# Patient Record
Sex: MALE | Race: WHITE | NOT HISPANIC OR LATINO | Employment: OTHER | URBAN - METROPOLITAN AREA
[De-identification: names, ages, dates, MRNs, and addresses within clinical notes are randomized per-mention and may not be internally consistent; named-entity substitution may affect disease eponyms.]

---

## 2022-10-07 ENCOUNTER — MEDICAL CORRESPONDENCE (OUTPATIENT)
Dept: HEALTH INFORMATION MANAGEMENT | Facility: CLINIC | Age: 82
End: 2022-10-07

## 2022-10-19 RX ORDER — FINASTERIDE 5 MG/1
5 TABLET, FILM COATED ORAL AT BEDTIME
COMMUNITY

## 2022-10-19 RX ORDER — TAMSULOSIN HYDROCHLORIDE 0.4 MG/1
0.4 CAPSULE ORAL EVERY EVENING
COMMUNITY

## 2022-10-19 RX ORDER — CEPHALEXIN 250 MG/1
250 TABLET ORAL DAILY
COMMUNITY

## 2022-10-19 RX ORDER — AMLODIPINE BESYLATE 5 MG/1
5 TABLET ORAL EVERY EVENING
COMMUNITY

## 2022-10-19 RX ORDER — ASPIRIN 81 MG/1
81 TABLET ORAL DAILY
Status: ON HOLD | COMMUNITY
End: 2022-11-03

## 2022-10-24 ENCOUNTER — TRANSFERRED RECORDS (OUTPATIENT)
Dept: HEALTH INFORMATION MANAGEMENT | Facility: CLINIC | Age: 82
End: 2022-10-24

## 2022-10-24 LAB
ALT SERPL-CCNC: 20 IU-L (ref 4–49)
AST SERPL-CCNC: 28 IU-L (ref 17–59)
CHOLESTEROL (EXTERNAL): 150 MG/DL (ref 0–200)
CREATININE (EXTERNAL): 0.9 MG/DL (ref 0.66–1.25)
GLUCOSE (EXTERNAL): 146 MG/DL (ref 74–99)
HBA1C MFR BLD: 6.2 % (ref 4.6–6.2)
HDLC SERPL-MCNC: 52 MG/DL (ref 40–60)
INR (EXTERNAL): 1 (ref 0.9–3.9)
LDL CHOLESTEROL CALCULATED (EXTERNAL): 73 MG/DL (ref 60–160)
POTASSIUM (EXTERNAL): 4.4 MMOL/L (ref 3.5–5.1)
TRIGLYCERIDES (EXTERNAL): 126 MG/DL (ref 0–150)

## 2022-11-02 ENCOUNTER — APPOINTMENT (OUTPATIENT)
Dept: GENERAL RADIOLOGY | Facility: CLINIC | Age: 82
DRG: 455 | End: 2022-11-02
Attending: ORTHOPAEDIC SURGERY
Payer: MEDICARE

## 2022-11-02 ENCOUNTER — HOSPITAL ENCOUNTER (INPATIENT)
Facility: CLINIC | Age: 82
LOS: 2 days | Discharge: HOME | DRG: 455 | End: 2022-11-04
Attending: ORTHOPAEDIC SURGERY | Admitting: ORTHOPAEDIC SURGERY
Payer: MEDICARE

## 2022-11-02 ENCOUNTER — ANESTHESIA EVENT (OUTPATIENT)
Dept: SURGERY | Facility: CLINIC | Age: 82
DRG: 455 | End: 2022-11-02
Payer: MEDICARE

## 2022-11-02 ENCOUNTER — ANESTHESIA (OUTPATIENT)
Dept: SURGERY | Facility: CLINIC | Age: 82
DRG: 455 | End: 2022-11-02
Payer: MEDICARE

## 2022-11-02 DIAGNOSIS — Z98.1 S/P LUMBAR FUSION: Primary | ICD-10-CM

## 2022-11-02 LAB
GLUCOSE BLDC GLUCOMTR-MCNC: 116 MG/DL (ref 70–99)
GLUCOSE BLDC GLUCOMTR-MCNC: 120 MG/DL (ref 70–99)
GLUCOSE BLDC GLUCOMTR-MCNC: 142 MG/DL (ref 70–99)
GLUCOSE BLDC GLUCOMTR-MCNC: 170 MG/DL (ref 70–99)
SARS-COV-2 RNA RESP QL NAA+PROBE: NEGATIVE

## 2022-11-02 PROCEDURE — 0SG00AJ FUSION OF LUMBAR VERTEBRAL JOINT WITH INTERBODY FUSION DEVICE, POSTERIOR APPROACH, ANTERIOR COLUMN, OPEN APPROACH: ICD-10-PCS | Performed by: ORTHOPAEDIC SURGERY

## 2022-11-02 PROCEDURE — 258N000003 HC RX IP 258 OP 636: Performed by: PHYSICIAN ASSISTANT

## 2022-11-02 PROCEDURE — 250N000013 HC RX MED GY IP 250 OP 250 PS 637: Performed by: PHYSICIAN ASSISTANT

## 2022-11-02 PROCEDURE — 250N000011 HC RX IP 250 OP 636: Performed by: NURSE ANESTHETIST, CERTIFIED REGISTERED

## 2022-11-02 PROCEDURE — 250N000013 HC RX MED GY IP 250 OP 250 PS 637: Performed by: ANESTHESIOLOGY

## 2022-11-02 PROCEDURE — 360N000085 HC SURGERY LEVEL 5 W/ FLUORO, PER MIN: Performed by: ORTHOPAEDIC SURGERY

## 2022-11-02 PROCEDURE — 278N000051 HC OR IMPLANT GENERAL: Performed by: ORTHOPAEDIC SURGERY

## 2022-11-02 PROCEDURE — 370N000017 HC ANESTHESIA TECHNICAL FEE, PER MIN: Performed by: ORTHOPAEDIC SURGERY

## 2022-11-02 PROCEDURE — 999N000179 XR SURGERY CARM FLUORO LESS THAN 5 MIN W STILLS: Mod: TC

## 2022-11-02 PROCEDURE — 250N000011 HC RX IP 250 OP 636: Performed by: PHYSICIAN ASSISTANT

## 2022-11-02 PROCEDURE — 258N000003 HC RX IP 258 OP 636: Performed by: ANESTHESIOLOGY

## 2022-11-02 PROCEDURE — 120N000001 HC R&B MED SURG/OB

## 2022-11-02 PROCEDURE — U0003 INFECTIOUS AGENT DETECTION BY NUCLEIC ACID (DNA OR RNA); SEVERE ACUTE RESPIRATORY SYNDROME CORONAVIRUS 2 (SARS-COV-2) (CORONAVIRUS DISEASE [COVID-19]), AMPLIFIED PROBE TECHNIQUE, MAKING USE OF HIGH THROUGHPUT TECHNOLOGIES AS DESCRIBED BY CMS-2020-01-R: HCPCS | Performed by: ORTHOPAEDIC SURGERY

## 2022-11-02 PROCEDURE — 710N000009 HC RECOVERY PHASE 1, LEVEL 1, PER MIN: Performed by: ORTHOPAEDIC SURGERY

## 2022-11-02 PROCEDURE — 8E0WXBF COMPUTER ASSISTED PROCEDURE OF TRUNK REGION, WITH FLUOROSCOPY: ICD-10-PCS | Performed by: ORTHOPAEDIC SURGERY

## 2022-11-02 PROCEDURE — C1713 ANCHOR/SCREW BN/BN,TIS/BN: HCPCS | Performed by: ORTHOPAEDIC SURGERY

## 2022-11-02 PROCEDURE — 250N000009 HC RX 250: Performed by: NURSE ANESTHETIST, CERTIFIED REGISTERED

## 2022-11-02 PROCEDURE — 99222 1ST HOSP IP/OBS MODERATE 55: CPT | Performed by: PHYSICIAN ASSISTANT

## 2022-11-02 PROCEDURE — 999N000141 HC STATISTIC PRE-PROCEDURE NURSING ASSESSMENT: Performed by: ORTHOPAEDIC SURGERY

## 2022-11-02 PROCEDURE — C1762 CONN TISS, HUMAN(INC FASCIA): HCPCS | Performed by: ORTHOPAEDIC SURGERY

## 2022-11-02 PROCEDURE — 0ST20ZZ RESECTION OF LUMBAR VERTEBRAL DISC, OPEN APPROACH: ICD-10-PCS | Performed by: ORTHOPAEDIC SURGERY

## 2022-11-02 PROCEDURE — 272N000001 HC OR GENERAL SUPPLY STERILE: Performed by: ORTHOPAEDIC SURGERY

## 2022-11-02 PROCEDURE — 0SG0071 FUSION OF LUMBAR VERTEBRAL JOINT WITH AUTOLOGOUS TISSUE SUBSTITUTE, POSTERIOR APPROACH, POSTERIOR COLUMN, OPEN APPROACH: ICD-10-PCS | Performed by: ORTHOPAEDIC SURGERY

## 2022-11-02 PROCEDURE — 250N000009 HC RX 250: Performed by: ORTHOPAEDIC SURGERY

## 2022-11-02 PROCEDURE — 250N000009 HC RX 250: Performed by: ANESTHESIOLOGY

## 2022-11-02 DEVICE — IMP SPI INTERBODY MEDT CATALYFT PL SHORT 7MM 6068073: Type: IMPLANTABLE DEVICE | Site: SPINE LUMBAR | Status: FUNCTIONAL

## 2022-11-02 DEVICE — GRAFT BONE FIBERS DBF 6ML INJ T50306 PRELOADED: Type: IMPLANTABLE DEVICE | Site: SPINE LUMBAR | Status: FUNCTIONAL

## 2022-11-02 DEVICE — IMP SCR MEDT 5.5/6.0MM SOLERA 7.5X35MM MA 55840007535: Type: IMPLANTABLE DEVICE | Site: SPINE LUMBAR | Status: FUNCTIONAL

## 2022-11-02 DEVICE — IMP SCR MEDT 5.5/6.0MM SOLERA 6.5X45MM MA 55840006545: Type: IMPLANTABLE DEVICE | Site: SPINE LUMBAR | Status: FUNCTIONAL

## 2022-11-02 DEVICE — IMP ROD MEDT SOLERA CVD 5.5X35MM TI 1553201035: Type: IMPLANTABLE DEVICE | Site: SPINE LUMBAR | Status: FUNCTIONAL

## 2022-11-02 DEVICE — IMP SCR SET MEDT SOLERA BREAK OFF 5.5MM TI 5540030: Type: IMPLANTABLE DEVICE | Site: SPINE LUMBAR | Status: FUNCTIONAL

## 2022-11-02 DEVICE — GRAFT BONE MAGNIFUSE 1CMX5CM 7509215: Type: IMPLANTABLE DEVICE | Site: SPINE LUMBAR | Status: FUNCTIONAL

## 2022-11-02 RX ORDER — LIDOCAINE 40 MG/G
CREAM TOPICAL
Status: DISCONTINUED | OUTPATIENT
Start: 2022-11-02 | End: 2022-11-02 | Stop reason: HOSPADM

## 2022-11-02 RX ORDER — ACETAMINOPHEN 325 MG/1
650 TABLET ORAL EVERY 4 HOURS PRN
Status: DISCONTINUED | OUTPATIENT
Start: 2022-11-05 | End: 2022-11-04 | Stop reason: HOSPADM

## 2022-11-02 RX ORDER — NEOSTIGMINE METHYLSULFATE 1 MG/ML
VIAL (ML) INJECTION PRN
Status: DISCONTINUED | OUTPATIENT
Start: 2022-11-02 | End: 2022-11-02

## 2022-11-02 RX ORDER — PROPOFOL 10 MG/ML
INJECTION, EMULSION INTRAVENOUS PRN
Status: DISCONTINUED | OUTPATIENT
Start: 2022-11-02 | End: 2022-11-02

## 2022-11-02 RX ORDER — METOPROLOL SUCCINATE 50 MG/1
50 TABLET, EXTENDED RELEASE ORAL DAILY
Status: DISCONTINUED | OUTPATIENT
Start: 2022-11-03 | End: 2022-11-04 | Stop reason: HOSPADM

## 2022-11-02 RX ORDER — NALOXONE HYDROCHLORIDE 0.4 MG/ML
0.2 INJECTION, SOLUTION INTRAMUSCULAR; INTRAVENOUS; SUBCUTANEOUS
Status: DISCONTINUED | OUTPATIENT
Start: 2022-11-02 | End: 2022-11-04 | Stop reason: HOSPADM

## 2022-11-02 RX ORDER — ONDANSETRON 4 MG/1
4 TABLET, ORALLY DISINTEGRATING ORAL EVERY 6 HOURS PRN
Status: DISCONTINUED | OUTPATIENT
Start: 2022-11-02 | End: 2022-11-04 | Stop reason: HOSPADM

## 2022-11-02 RX ORDER — GABAPENTIN 100 MG/1
100 CAPSULE ORAL
Status: COMPLETED | OUTPATIENT
Start: 2022-11-02 | End: 2022-11-02

## 2022-11-02 RX ORDER — BUPIVACAINE HYDROCHLORIDE AND EPINEPHRINE 2.5; 5 MG/ML; UG/ML
INJECTION, SOLUTION EPIDURAL; INFILTRATION; INTRACAUDAL; PERINEURAL PRN
Status: DISCONTINUED | OUTPATIENT
Start: 2022-11-02 | End: 2022-11-02 | Stop reason: HOSPADM

## 2022-11-02 RX ORDER — HYDROXYZINE HYDROCHLORIDE 10 MG/1
10 TABLET, FILM COATED ORAL EVERY 6 HOURS PRN
Status: DISCONTINUED | OUTPATIENT
Start: 2022-11-02 | End: 2022-11-04 | Stop reason: HOSPADM

## 2022-11-02 RX ORDER — FINASTERIDE 5 MG/1
5 TABLET, FILM COATED ORAL AT BEDTIME
Status: DISCONTINUED | OUTPATIENT
Start: 2022-11-02 | End: 2022-11-04 | Stop reason: HOSPADM

## 2022-11-02 RX ORDER — VITAMIN B COMPLEX
25 TABLET ORAL 2 TIMES DAILY
Status: DISCONTINUED | OUTPATIENT
Start: 2022-11-02 | End: 2022-11-04 | Stop reason: HOSPADM

## 2022-11-02 RX ORDER — LIDOCAINE 40 MG/G
CREAM TOPICAL
Status: DISCONTINUED | OUTPATIENT
Start: 2022-11-02 | End: 2022-11-04 | Stop reason: HOSPADM

## 2022-11-02 RX ORDER — ONDANSETRON 2 MG/ML
INJECTION INTRAMUSCULAR; INTRAVENOUS PRN
Status: DISCONTINUED | OUTPATIENT
Start: 2022-11-02 | End: 2022-11-02

## 2022-11-02 RX ORDER — SODIUM CHLORIDE, SODIUM LACTATE, POTASSIUM CHLORIDE, CALCIUM CHLORIDE 600; 310; 30; 20 MG/100ML; MG/100ML; MG/100ML; MG/100ML
INJECTION, SOLUTION INTRAVENOUS CONTINUOUS
Status: DISCONTINUED | OUTPATIENT
Start: 2022-11-02 | End: 2022-11-02 | Stop reason: HOSPADM

## 2022-11-02 RX ORDER — DEXTROSE MONOHYDRATE 25 G/50ML
25-50 INJECTION, SOLUTION INTRAVENOUS
Status: DISCONTINUED | OUTPATIENT
Start: 2022-11-02 | End: 2022-11-04 | Stop reason: HOSPADM

## 2022-11-02 RX ORDER — CEFAZOLIN SODIUM 1 G/3ML
1 INJECTION, POWDER, FOR SOLUTION INTRAMUSCULAR; INTRAVENOUS EVERY 8 HOURS
Status: COMPLETED | OUTPATIENT
Start: 2022-11-02 | End: 2022-11-03

## 2022-11-02 RX ORDER — ACETAMINOPHEN 325 MG/1
975 TABLET ORAL EVERY 8 HOURS
Status: DISCONTINUED | OUTPATIENT
Start: 2022-11-02 | End: 2022-11-04 | Stop reason: HOSPADM

## 2022-11-02 RX ORDER — GLYCOPYRROLATE 0.2 MG/ML
INJECTION, SOLUTION INTRAMUSCULAR; INTRAVENOUS PRN
Status: DISCONTINUED | OUTPATIENT
Start: 2022-11-02 | End: 2022-11-02

## 2022-11-02 RX ORDER — HYDROMORPHONE HCL IN WATER/PF 6 MG/30 ML
0.2 PATIENT CONTROLLED ANALGESIA SYRINGE INTRAVENOUS EVERY 5 MIN PRN
Status: DISCONTINUED | OUTPATIENT
Start: 2022-11-02 | End: 2022-11-02 | Stop reason: HOSPADM

## 2022-11-02 RX ORDER — CEPHALEXIN 250 MG/1
250 TABLET ORAL DAILY
Status: DISCONTINUED | OUTPATIENT
Start: 2022-11-03 | End: 2022-11-03 | Stop reason: CLARIF

## 2022-11-02 RX ORDER — METOPROLOL SUCCINATE 50 MG/1
50 TABLET, EXTENDED RELEASE ORAL DAILY
COMMUNITY

## 2022-11-02 RX ORDER — CEFAZOLIN SODIUM/WATER 2 G/20 ML
2 SYRINGE (ML) INTRAVENOUS
Status: COMPLETED | OUTPATIENT
Start: 2022-11-02 | End: 2022-11-02

## 2022-11-02 RX ORDER — HYDROMORPHONE HYDROCHLORIDE 2 MG/1
4 TABLET ORAL EVERY 4 HOURS PRN
Status: DISCONTINUED | OUTPATIENT
Start: 2022-11-02 | End: 2022-11-04 | Stop reason: HOSPADM

## 2022-11-02 RX ORDER — MAGNESIUM HYDROXIDE 1200 MG/15ML
LIQUID ORAL PRN
Status: DISCONTINUED | OUTPATIENT
Start: 2022-11-02 | End: 2022-11-02 | Stop reason: HOSPADM

## 2022-11-02 RX ORDER — FENTANYL CITRATE 50 UG/ML
25 INJECTION, SOLUTION INTRAMUSCULAR; INTRAVENOUS EVERY 5 MIN PRN
Status: DISCONTINUED | OUTPATIENT
Start: 2022-11-02 | End: 2022-11-02 | Stop reason: HOSPADM

## 2022-11-02 RX ORDER — SODIUM CHLORIDE 9 MG/ML
INJECTION, SOLUTION INTRAVENOUS CONTINUOUS
Status: DISCONTINUED | OUTPATIENT
Start: 2022-11-02 | End: 2022-11-04 | Stop reason: HOSPADM

## 2022-11-02 RX ORDER — HYDROMORPHONE HCL IN WATER/PF 6 MG/30 ML
0.4 PATIENT CONTROLLED ANALGESIA SYRINGE INTRAVENOUS
Status: DISCONTINUED | OUTPATIENT
Start: 2022-11-02 | End: 2022-11-04 | Stop reason: HOSPADM

## 2022-11-02 RX ORDER — HYDROMORPHONE HYDROCHLORIDE 2 MG/1
2 TABLET ORAL EVERY 4 HOURS PRN
Status: DISCONTINUED | OUTPATIENT
Start: 2022-11-02 | End: 2022-11-04 | Stop reason: HOSPADM

## 2022-11-02 RX ORDER — CEFAZOLIN SODIUM/WATER 2 G/20 ML
2 SYRINGE (ML) INTRAVENOUS SEE ADMIN INSTRUCTIONS
Status: DISCONTINUED | OUTPATIENT
Start: 2022-11-02 | End: 2022-11-02 | Stop reason: HOSPADM

## 2022-11-02 RX ORDER — METHOCARBAMOL 500 MG/1
500 TABLET, FILM COATED ORAL EVERY 6 HOURS PRN
Status: DISCONTINUED | OUTPATIENT
Start: 2022-11-02 | End: 2022-11-04 | Stop reason: HOSPADM

## 2022-11-02 RX ORDER — VASOPRESSIN 20 U/ML
INJECTION PARENTERAL PRN
Status: DISCONTINUED | OUTPATIENT
Start: 2022-11-02 | End: 2022-11-02

## 2022-11-02 RX ORDER — PANTOPRAZOLE SODIUM 40 MG/1
40 TABLET, DELAYED RELEASE ORAL DAILY
Status: DISCONTINUED | OUTPATIENT
Start: 2022-11-02 | End: 2022-11-04 | Stop reason: HOSPADM

## 2022-11-02 RX ORDER — OXYCODONE HYDROCHLORIDE 5 MG/1
5 TABLET ORAL EVERY 4 HOURS PRN
Status: DISCONTINUED | OUTPATIENT
Start: 2022-11-02 | End: 2022-11-02 | Stop reason: HOSPADM

## 2022-11-02 RX ORDER — POLYETHYLENE GLYCOL 3350 17 G/17G
17 POWDER, FOR SOLUTION ORAL DAILY
Status: DISCONTINUED | OUTPATIENT
Start: 2022-11-03 | End: 2022-11-04 | Stop reason: HOSPADM

## 2022-11-02 RX ORDER — NALOXONE HYDROCHLORIDE 0.4 MG/ML
0.4 INJECTION, SOLUTION INTRAMUSCULAR; INTRAVENOUS; SUBCUTANEOUS
Status: DISCONTINUED | OUTPATIENT
Start: 2022-11-02 | End: 2022-11-04 | Stop reason: HOSPADM

## 2022-11-02 RX ORDER — AMOXICILLIN 250 MG
1 CAPSULE ORAL 2 TIMES DAILY
Status: DISCONTINUED | OUTPATIENT
Start: 2022-11-02 | End: 2022-11-04 | Stop reason: HOSPADM

## 2022-11-02 RX ORDER — ONDANSETRON 2 MG/ML
4 INJECTION INTRAMUSCULAR; INTRAVENOUS EVERY 6 HOURS PRN
Status: DISCONTINUED | OUTPATIENT
Start: 2022-11-02 | End: 2022-11-04 | Stop reason: HOSPADM

## 2022-11-02 RX ORDER — ONDANSETRON 2 MG/ML
4 INJECTION INTRAMUSCULAR; INTRAVENOUS EVERY 30 MIN PRN
Status: DISCONTINUED | OUTPATIENT
Start: 2022-11-02 | End: 2022-11-02 | Stop reason: HOSPADM

## 2022-11-02 RX ORDER — TAMSULOSIN HYDROCHLORIDE 0.4 MG/1
0.4 CAPSULE ORAL EVERY EVENING
Status: DISCONTINUED | OUTPATIENT
Start: 2022-11-02 | End: 2022-11-04 | Stop reason: HOSPADM

## 2022-11-02 RX ORDER — NICOTINE POLACRILEX 4 MG
15-30 LOZENGE BUCCAL
Status: DISCONTINUED | OUTPATIENT
Start: 2022-11-02 | End: 2022-11-04 | Stop reason: HOSPADM

## 2022-11-02 RX ORDER — METFORMIN HCL 500 MG
500 TABLET, EXTENDED RELEASE 24 HR ORAL
COMMUNITY

## 2022-11-02 RX ORDER — ONDANSETRON 4 MG/1
4 TABLET, ORALLY DISINTEGRATING ORAL EVERY 30 MIN PRN
Status: DISCONTINUED | OUTPATIENT
Start: 2022-11-02 | End: 2022-11-02 | Stop reason: HOSPADM

## 2022-11-02 RX ORDER — METOPROLOL TARTRATE 1 MG/ML
1-2 INJECTION, SOLUTION INTRAVENOUS EVERY 5 MIN PRN
Status: DISCONTINUED | OUTPATIENT
Start: 2022-11-02 | End: 2022-11-02 | Stop reason: HOSPADM

## 2022-11-02 RX ORDER — BISACODYL 10 MG
10 SUPPOSITORY, RECTAL RECTAL DAILY PRN
Status: DISCONTINUED | OUTPATIENT
Start: 2022-11-02 | End: 2022-11-04 | Stop reason: HOSPADM

## 2022-11-02 RX ORDER — FENTANYL CITRATE 50 UG/ML
INJECTION, SOLUTION INTRAMUSCULAR; INTRAVENOUS PRN
Status: DISCONTINUED | OUTPATIENT
Start: 2022-11-02 | End: 2022-11-02

## 2022-11-02 RX ORDER — HYDROMORPHONE HCL IN WATER/PF 6 MG/30 ML
0.2 PATIENT CONTROLLED ANALGESIA SYRINGE INTRAVENOUS
Status: DISCONTINUED | OUTPATIENT
Start: 2022-11-02 | End: 2022-11-04 | Stop reason: HOSPADM

## 2022-11-02 RX ORDER — PROCHLORPERAZINE MALEATE 5 MG
5 TABLET ORAL EVERY 6 HOURS PRN
Status: DISCONTINUED | OUTPATIENT
Start: 2022-11-02 | End: 2022-11-04 | Stop reason: HOSPADM

## 2022-11-02 RX ADMIN — ROCURONIUM BROMIDE 40 MG: 50 INJECTION, SOLUTION INTRAVENOUS at 08:22

## 2022-11-02 RX ADMIN — SODIUM CHLORIDE, POTASSIUM CHLORIDE, SODIUM LACTATE AND CALCIUM CHLORIDE: 600; 310; 30; 20 INJECTION, SOLUTION INTRAVENOUS at 09:22

## 2022-11-02 RX ADMIN — SODIUM CHLORIDE 500 ML: 9 INJECTION, SOLUTION INTRAVENOUS at 14:17

## 2022-11-02 RX ADMIN — GABAPENTIN 100 MG: 100 CAPSULE ORAL at 07:26

## 2022-11-02 RX ADMIN — HYDROXYZINE HYDROCHLORIDE 10 MG: 10 TABLET ORAL at 13:22

## 2022-11-02 RX ADMIN — PANTOPRAZOLE SODIUM 40 MG: 40 TABLET, DELAYED RELEASE ORAL at 21:04

## 2022-11-02 RX ADMIN — ROCURONIUM BROMIDE 20 MG: 50 INJECTION, SOLUTION INTRAVENOUS at 09:20

## 2022-11-02 RX ADMIN — CEFAZOLIN 1 G: 1 INJECTION, POWDER, FOR SOLUTION INTRAMUSCULAR; INTRAVENOUS at 16:49

## 2022-11-02 RX ADMIN — METHOCARBAMOL 500 MG: 500 TABLET ORAL at 11:50

## 2022-11-02 RX ADMIN — GLYCOPYRROLATE 0.8 MG: 0.2 INJECTION, SOLUTION INTRAMUSCULAR; INTRAVENOUS at 11:02

## 2022-11-02 RX ADMIN — OXYCODONE HYDROCHLORIDE 5 MG: 5 TABLET ORAL at 11:50

## 2022-11-02 RX ADMIN — PROPOFOL 120 MG: 10 INJECTION, EMULSION INTRAVENOUS at 08:22

## 2022-11-02 RX ADMIN — HYDROMORPHONE HYDROCHLORIDE 0.5 MG: 1 INJECTION, SOLUTION INTRAMUSCULAR; INTRAVENOUS; SUBCUTANEOUS at 11:09

## 2022-11-02 RX ADMIN — ACETAMINOPHEN 975 MG: 325 TABLET, FILM COATED ORAL at 13:22

## 2022-11-02 RX ADMIN — Medication 1 TABLET: at 13:22

## 2022-11-02 RX ADMIN — FINASTERIDE 5 MG: 5 TABLET, FILM COATED ORAL at 21:03

## 2022-11-02 RX ADMIN — METHOCARBAMOL 500 MG: 500 TABLET ORAL at 21:04

## 2022-11-02 RX ADMIN — VASOPRESSIN 2 UNITS: 20 INJECTION INTRAVENOUS at 10:35

## 2022-11-02 RX ADMIN — HYDROMORPHONE HYDROCHLORIDE 2 MG: 2 TABLET ORAL at 17:07

## 2022-11-02 RX ADMIN — VASOPRESSIN 1 UNITS: 20 INJECTION INTRAVENOUS at 09:06

## 2022-11-02 RX ADMIN — VASOPRESSIN 2 UNITS: 20 INJECTION INTRAVENOUS at 08:47

## 2022-11-02 RX ADMIN — ONDANSETRON HYDROCHLORIDE 4 MG: 2 INJECTION, SOLUTION INTRAVENOUS at 10:37

## 2022-11-02 RX ADMIN — FENTANYL CITRATE 50 MCG: 50 INJECTION, SOLUTION INTRAMUSCULAR; INTRAVENOUS at 08:22

## 2022-11-02 RX ADMIN — SENNOSIDES AND DOCUSATE SODIUM 1 TABLET: 50; 8.6 TABLET ORAL at 21:04

## 2022-11-02 RX ADMIN — ROCURONIUM BROMIDE 30 MG: 50 INJECTION, SOLUTION INTRAVENOUS at 09:50

## 2022-11-02 RX ADMIN — PROPOFOL 50 MCG/KG/MIN: 10 INJECTION, EMULSION INTRAVENOUS at 08:42

## 2022-11-02 RX ADMIN — HYDROMORPHONE HYDROCHLORIDE 0.5 MG: 1 INJECTION, SOLUTION INTRAMUSCULAR; INTRAVENOUS; SUBCUTANEOUS at 11:06

## 2022-11-02 RX ADMIN — NEOSTIGMINE METHYLSULFATE 5 MG: 1 INJECTION, SOLUTION INTRAVENOUS at 11:02

## 2022-11-02 RX ADMIN — SODIUM CHLORIDE: 9 INJECTION, SOLUTION INTRAVENOUS at 13:09

## 2022-11-02 RX ADMIN — ROCURONIUM BROMIDE 10 MG: 50 INJECTION, SOLUTION INTRAVENOUS at 08:41

## 2022-11-02 RX ADMIN — TAMSULOSIN HYDROCHLORIDE 0.4 MG: 0.4 CAPSULE ORAL at 21:03

## 2022-11-02 RX ADMIN — GLYCOPYRROLATE 0.2 MG: 0.2 INJECTION, SOLUTION INTRAMUSCULAR; INTRAVENOUS at 08:22

## 2022-11-02 RX ADMIN — SODIUM CHLORIDE, POTASSIUM CHLORIDE, SODIUM LACTATE AND CALCIUM CHLORIDE: 600; 310; 30; 20 INJECTION, SOLUTION INTRAVENOUS at 08:13

## 2022-11-02 RX ADMIN — VASOPRESSIN 1 UNITS: 20 INJECTION INTRAVENOUS at 08:36

## 2022-11-02 RX ADMIN — SODIUM CHLORIDE, POTASSIUM CHLORIDE, SODIUM LACTATE AND CALCIUM CHLORIDE: 600; 310; 30; 20 INJECTION, SOLUTION INTRAVENOUS at 07:23

## 2022-11-02 RX ADMIN — VASOPRESSIN 2 UNITS: 20 INJECTION INTRAVENOUS at 10:04

## 2022-11-02 RX ADMIN — HYDROMORPHONE HYDROCHLORIDE 2 MG: 2 TABLET ORAL at 21:15

## 2022-11-02 RX ADMIN — FENTANYL CITRATE 50 MCG: 50 INJECTION, SOLUTION INTRAMUSCULAR; INTRAVENOUS at 08:19

## 2022-11-02 RX ADMIN — Medication 2 G: at 08:13

## 2022-11-02 RX ADMIN — ACETAMINOPHEN 975 MG: 325 TABLET, FILM COATED ORAL at 21:03

## 2022-11-02 RX ADMIN — LIDOCAINE HYDROCHLORIDE 30 MG: 10 INJECTION, SOLUTION EPIDURAL; INFILTRATION; INTRACAUDAL; PERINEURAL at 08:22

## 2022-11-02 ASSESSMENT — ACTIVITIES OF DAILY LIVING (ADL)
ADLS_ACUITY_SCORE: 20
ADLS_ACUITY_SCORE: 24
ADLS_ACUITY_SCORE: 20
ADLS_ACUITY_SCORE: 20

## 2022-11-02 ASSESSMENT — LIFESTYLE VARIABLES: TOBACCO_USE: 0

## 2022-11-02 ASSESSMENT — ENCOUNTER SYMPTOMS: DYSRHYTHMIAS: 0

## 2022-11-02 NOTE — ANESTHESIA PREPROCEDURE EVALUATION
Anesthesia Pre-Procedure Evaluation    Patient: Leo Morelos   MRN: [id number] : 1940        Procedure : Procedure(s):  LUMBAR 4 TO 5 POSTERIOR LUMBAR INSTRUMENTED FUSION WITH OR WITHOUT INTERBODY CAGE AND WITH OR WITHOUT LAMINECTOMIES.          Past Medical History:   Diagnosis Date     Arthritis      Diabetes (H)      Gastroesophageal reflux disease      Hypertension      Stented coronary artery       Past Surgical History:   Procedure Laterality Date     CARDIAC SURGERY  2007    2 stents     ORTHOPEDIC SURGERY Right     total knee      No Known Allergies   Social History     Tobacco Use     Smoking status: Former     Types: Cigarettes     Quit date: 1978     Years since quittin.0     Smokeless tobacco: Never   Substance Use Topics     Alcohol use: Yes     Comment: rarely      Wt Readings from Last 1 Encounters:   22 73.4 kg (161 lb 14.4 oz)        Anesthesia Evaluation            ROS/MED HX  ENT/Pulmonary:    (-) tobacco use and sleep apnea   Neurologic:     (+) peripheral neuropathy,     Cardiovascular:     (+) Dyslipidemia hypertension--CAD (Walks for miles pheasant hunting) --stent-. 2 Previous cardiac testing   Echo: Date: Results:    Stress Test: Date:  Results:  Neg per H&P  ECG Reviewed: Date: Results:    Cath: Date: Results:   (-) CHF and arrhythmias   METS/Exercise Tolerance: >4 METS    Hematologic:    (-) anemia   Musculoskeletal:       GI/Hepatic:     (+) GERD,     Renal/Genitourinary:       Endo:     (+) type II DM, Last HgA1c: 6.5, Not using insulin, - not using insulin pump.  (-) obesity   Psychiatric/Substance Use:  - neg psychiatric ROS     Infectious Disease:  - neg infectious disease ROS     Malignancy:       Other:      (+) , H/O Chronic Pain (back),        Physical Exam    Airway        Mallampati: II   TM distance: > 3 FB   Neck ROM: full   Mouth opening: > 3 cm    Respiratory Devices and Support         Dental           Cardiovascular   cardiovascular exam  normal          Pulmonary   pulmonary exam normal            Other findings: No lab results found.   No lab results found.    OUTSIDE LABS:  CBC: No results found for: WBC, HGB, HCT, PLT  BMP: No results found for: NA, POTASSIUM, CHLORIDE, CO2, BUN, CR, GLC  COAGS: No results found for: PTT, INR, FIBR  POC: No results found for: BGM, HCG, HCGS  HEPATIC: No results found for: ALBUMIN, PROTTOTAL, ALT, AST, GGT, ALKPHOS, BILITOTAL, BILIDIRECT, AMALIA  OTHER: No results found for: PH, LACT, A1C, AMANDA, PHOS, MAG, LIPASE, AMYLASE, TSH, T4, T3, CRP, SED    Anesthesia Plan    ASA Status:  3      Anesthesia Type: General.     - Airway: ETT   Induction: Propofol.   Maintenance: Balanced.        Consents    Anesthesia Plan(s) and associated risks, benefits, and realistic alternatives discussed. Questions answered and patient/representative(s) expressed understanding.    - Discussed:     - Discussed with:  Patient      - Extended Intubation/Ventilatory Support Discussed: No.      - Patient is DNR/DNI Status: No    Use of blood products discussed: Yes.     - Discussed with: Patient.     - Consented: consented to blood products            Reason for refusal: other.     Postoperative Care    Pain management: IV analgesics, Oral pain medications.   PONV prophylaxis: Ondansetron (or other 5HT-3), Background Propofol Infusion     Comments:    Other Comments: precedex gtt            Chino Pedraza MD

## 2022-11-02 NOTE — ANESTHESIA PROCEDURE NOTES
Airway       Patient location during procedure: OR       Procedure Start/Stop Times: 11/2/2022 8:25 AM  Staff -        CRNA: Damien Hernández APRN CRNA       Performed By: CRNA  Consent for Airway        Urgency: elective  Indications and Patient Condition       Indications for airway management: cheryl-procedural       Induction type:intravenous       Mask difficulty assessment: 1 - vent by mask    Final Airway Details       Final airway type: endotracheal airway       Successful airway: ETT - single and Oral  Endotracheal Airway Details        ETT size (mm): 8.0       Cuffed: yes       Successful intubation technique: direct laryngoscopy       DL Blade Type: Ragland 2       Grade View of Cords: 1       Adjucts: stylet       Position: Right       Measured from: lips       Secured at (cm): 22       Bite block used: None    Post intubation assessment        Placement verified by: capnometry, equal breath sounds and chest rise        Number of attempts at approach: 1       Number of other approaches attempted: 0       Secured with: plastic tape       Ease of procedure: easy       Dentition: Intact and Unchanged    Medication(s) Administered   Medication Administration Time: 11/2/2022 8:25 AM

## 2022-11-02 NOTE — PROGRESS NOTES
Arrived to room 620 from PACU at 12:15 pm via cart, transferred to bed via hover mat without difficulty, alert and oriented x 4, oriented to room and call system, dressing CDI, CMS intact, IV patent and infusing, hemovac and irvin patent, rates pain 4/10, reviewed welcome folder and pain/medication information packet with patient and family. SCD's on BLE. Olga ice chips and water well. Frequent VS started.

## 2022-11-02 NOTE — PHARMACY-ADMISSION MEDICATION HISTORY
Medication reconciliation completed by pre-admitting.    Prior to Admission medications    Medication Sig Last Dose Taking? Auth Provider Long Term End Date   amLODIPine (NORVASC) 5 MG tablet Take 5 mg by mouth every evening 11/1/2022 at 1800 Yes Reported, Patient Yes    aspirin 81 MG EC tablet Take 81 mg by mouth daily Past Month Yes Reported, Patient     cephalexin 250 MG TABS Take 250 mg by mouth daily 11/1/2022 at 1800 Yes Reported, Patient     finasteride (PROSCAR) 5 MG tablet Take 5 mg by mouth At Bedtime 11/1/2022 at 1800 Yes Reported, Patient     LISINOPRIL PO Take 40 mg by mouth daily 11/1/2022 at 1800 Yes Reported, Patient Yes    metFORMIN (GLUCOPHAGE XR) 500 MG 24 hr tablet Take 500 mg by mouth daily (with dinner) 11/1/2022 at 1800 Yes Unknown, Entered By History Yes    metoprolol succinate ER (TOPROL XL) 50 MG 24 hr tablet Take 50 mg by mouth daily 11/2/2022 at 0400 Yes Unknown, Entered By History Yes    omeprazole (PRILOSEC) 20 MG DR capsule Take 20 mg by mouth daily 11/1/2022 at 0800 Yes Reported, Patient     SIMVASTATIN PO Take 40 mg by mouth At Bedtime 11/1/2022 at 1800 Yes Reported, Patient Yes    tamsulosin (FLOMAX) 0.4 MG capsule Take 0.4 mg by mouth every evening 11/1/2022 at 1800 Yes Reported, Patient

## 2022-11-02 NOTE — BRIEF OP NOTE
[facility] Brief Operative Note    Pre-operative diagnosis: L45 Stenosis, spinal, lumbar [M48.061]  Neurogenic claudication due to lumbar spinal stenosis [M48.062]  Radiculopathy [M54.10]  L45 Spondylolisthesis of lumbar region [M43.16]     Post-operative diagnosis * No post-op diagnosis entered *  Same     Procedure: Procedure(s):  LUMBAR 4 TO 5 POSTERIOR LUMBAR INSTRUMENTED FUSION WITH INTERBODY CAGE AND LAMINECTOMIES.      Surgeon(s): Surgeon(s) and Role:     * Russ Toure MD - Primary     * Dc Lombardi PA-C - Assisting   Estimated blood loss: 100 mL    Specimens: * No specimens in log *   Findings: No comp  [employer] instrumentation screws and cage  1x5 Amolafuse    Russ Toure MD

## 2022-11-02 NOTE — CONSULTS
[facility]  Hospitalist Consult Note  Name: Leo Morelos    MRN: [id number]  YOB: 1940    Age: 82 year old  Date of admission: 11/2/2022  Primary care provider: System, Provider Not In     Requesting Physician:  Dc Lombardi PA-C   Reason for consult:  Post-operative medical management         Assessment and Plan:   Leo Morelos is a 82 year old male with a history of coronary artery disease status post JESSE, dyslipidemia, hypertension, chronic prostatitis, who was admitted for lumbar 4-5 posterior instrumented fusion and laminectomies.     1. Spinal Stenosis s/p lumbar 4-5 posterior lung instrumented fusion and laminectomies on 11/2: The patient is doing well, currently has well controlled pain and is hemodynamically stable. Will defer diet, activity, DVT prophylaxis, and pain control to the primary team. Currently the patient is on oxycodone, Robaxin, gabapentin. Continue physical and occupational therapy. Continue incentive spirometry and check hemoglobin to evaluate for surgical blood loss and potential need for transfusion,  cc.     2. Type II DM  A1c well controlled ~6.2-6.5 per H&P. On metformin PTA.  - sliding scale insulin, poct sugar checks post op  - likely resume metformin 11/2     3. CAD s/p JESSE  HLD  Stable, no cp or dyspnea.   Hx of jesse 2007, subsequent reassuring lexiscan latest in 2019. Per H&P follows with Cardiologist in [place].   - resume ASA, ace, MP    4. HTN  - resume BP meds as above, staggered     Addendum 13:49 -    BP softer this afternoon, lower UO noted in irvin bag. Add bolus NS.   - Monitor BP  - hold lisinopril  - likely resume Toprol 11/3 with BP improved     5. Chronic Prostatitis  BPH  - resume chronic ppx keflex prescribed by Urologist   - TOV per surgical team       Code status: full per admitting   Prophylaxis: PCD/s   Disposition: home with support from daughters, once cleared pt/ot/medically. Patient is from [place], ambulatory. Here  with family support post surgery as daughters live more locally.    Thank you for the consultation, we will continue to follow along during the hospitalization. Please page with any questions or concerns.         History of Present Illness:   Leo Morelos is a 82 year old male who was hospitalized for lumbar 4-5 posterior instrumented fusion and laminectomies. Pre-operative note was fully reviewed and recommendations acknowledged. Op note and anesthesia notes and flow sheets reviewed.     The patient had no complications related to the procedure and has had an unremarkable post-operative course to this point. Currently pain is adequately controlled. No nausea, vomiting, diarrhea or constipation. No fevers, chills, diaphoresis. No chest pain, palpitations, dyspnea. Tejeda catheter still in place. Tolerating oral intake. No excessive somnolence and patient is fully alert and oriented. The patient has no other complaints at this time.                  Past Medical History:     Past Medical History:   Diagnosis Date     Arthritis      Diabetes (H)      Gastroesophageal reflux disease      Hypertension      Stented coronary artery              Past Surgical History:     Past Surgical History:   Procedure Laterality Date     CARDIAC SURGERY      2 stents     ORTHOPEDIC SURGERY Right     total knee               Social History:     Social History     Tobacco Use     Smoking status: Former     Types: Cigarettes     Quit date: 1978     Years since quittin.0     Smokeless tobacco: Never   Substance Use Topics     Alcohol use: Yes     Comment: rarely   Alcohol use described as two beers monthly.           Family History:   Family history was fully reviewed and non-contributory in this case.          Allergies:   No Known Allergies          Medications:     Prior to Admission medications    Medication Sig Last Dose Taking? Auth Provider Long Term End Date   amLODIPine (NORVASC) 5 MG tablet Take 5 mg by mouth  "every evening 11/1/2022 at 1800 Yes Reported, Patient Yes    aspirin 81 MG EC tablet Take 81 mg by mouth daily Past Month Yes Reported, Patient     cephalexin 250 MG TABS Take 250 mg by mouth daily 11/1/2022 at 1800 Yes Reported, Patient     finasteride (PROSCAR) 5 MG tablet Take 5 mg by mouth At Bedtime 11/1/2022 at 1800 Yes Reported, Patient     LISINOPRIL PO Take 40 mg by mouth daily 11/1/2022 at 1800 Yes Reported, Patient Yes    METFORMIN HCL PO Take 500 mg by mouth At Bedtime 11/1/2022 at 1800 Yes Reported, Patient Yes    METOPROLOL TARTRATE 50 mg daily 11/2/2022 at 0400 Yes Reported, Patient Yes    omeprazole (PRILOSEC) 20 MG DR capsule Take 20 mg by mouth daily 11/1/2022 at 0800 Yes Reported, Patient     SIMVASTATIN PO Take 40 mg by mouth At Bedtime 11/1/2022 at 1800 Yes Reported, Patient Yes    tamsulosin (FLOMAX) 0.4 MG capsule Take 0.4 mg by mouth every evening 11/1/2022 at 1800 Yes Reported, Patient         Current hospital administered medication list (MAR) also reviewed.          Review of Systems:     A comprehensive greater than 10 system review of systems was carried out.  Pertinent positives and negatives are noted above.  Otherwise negative for contributory info.            Physical Exam:   Blood pressure 104/46, pulse 56, temperature 96.9  F (36.1  C), temperature source Temporal, resp. rate 20, height 1.715 m (5' 7.5\"), weight 73.4 kg (161 lb 14.4 oz), SpO2 94 %.  Exam:  General: Alert, awake, no acute distress.  HEENT: Normocephalic and atraumatic, eyes anicteric and without scleral injection, EOMI, face symmetric, MMM.  Cardiac: RRR, normal S1, S2. No m/g/r, no LE edema.  Pulmonary: Normal chest rise, normal work of breathing.  Lungs CTAB without crackles or wheezing.  Abdomen: soft, non-tender, non-distended.  Normoactive bowel sounds, no guarding or rebound tenderness.  Extremities: no deformities.  Warm, well perfused.  Skin: no rashes or lesions.  Warm and Dry.  Neuro: No focal deficits.  " Speech clear.  Spontaneously moving all extremities in bed.  Psych: Alert and oriented x3. Appropriate affect.          Data:   Blood sugars, covid pcr neg,.    Ni Fisher PA-C  [facility] Hospitalist  November 2, 2022

## 2022-11-02 NOTE — ANESTHESIA POSTPROCEDURE EVALUATION
Patient: Leo Morelos    Procedure: Procedure(s):  LUMBAR 4 TO 5 POSTERIOR LUMBAR INSTRUMENTED FUSION WITH INTERBODY CAGE AND LAMINECTOMIES.       Anesthesia Type:  General    Note:  Disposition: Inpatient   Postop Pain Control: Uneventful            Sign Out: Well controlled pain   PONV: No   Neuro/Psych: Uneventful            Sign Out: Acceptable/Baseline neuro status   Airway/Respiratory: Uneventful            Sign Out: AIRWAY IN SITU/Resp. Support   CV/Hemodynamics: Uneventful            Sign Out: Acceptable CV status; No obvious hypovolemia; No obvious fluid overload   Other NRE: NONE   DID A NON-ROUTINE EVENT OCCUR? No           Last vitals:  Vitals Value Taken Time   /45 11/02/22 1150   Temp     Pulse 59 11/02/22 1150   Resp     SpO2 95 % 11/02/22 1208       Electronically Signed By: Chino Pedraza MD  November 2, 2022  2:41 PM

## 2022-11-02 NOTE — ANESTHESIA CARE TRANSFER NOTE
Patient: Leo Morelos    Procedure: Procedure(s):  LUMBAR 4 TO 5 POSTERIOR LUMBAR INSTRUMENTED FUSION WITH INTERBODY CAGE AND LAMINECTOMIES.       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    Anesthesia Type:   General     Note:    Oropharynx: oropharynx clear of all foreign objects  Level of Consciousness: drowsy  Oxygen Supplementation: face mask  Level of Supplemental Oxygen (L/min / FiO2): 6  Independent Airway: airway patency satisfactory and stable  Dentition: dentition unchanged  Vital Signs Stable: post-procedure vital signs reviewed and stable  Report to RN Given: handoff report given  Patient transferred to: PACU    Handoff Report: Identifed the Patient, Identified the Reponsible Provider, Reviewed the pertinent medical history, Discussed the surgical course, Reviewed Intra-OP anesthesia mangement and issues during anesthesia, Set expectations for post-procedure period and Allowed opportunity for questions and acknowledgement of understanding      Vitals:  Vitals Value Taken Time   BP 83/42 11/02/22 1121   Temp     Pulse 58 11/02/22 1121   Resp     SpO2 98 % 11/02/22 1126   Vitals shown include unvalidated device data.    Electronically Signed By: MIL Duggan CRNA  November 2, 2022  11:27 AM

## 2022-11-02 NOTE — PLAN OF CARE
Goal Outcome Evaluation:      Plan of Care Reviewed With: patient, family    Patient vital signs are at baseline: No,  Reason:  hypotensive receiving 500 ml bolus.  Patient able to ambulate as they were prior to admission or with assist devices provided by therapies during their stay:  No,  Reason:  has not out of bed yet.   Patient MUST void prior to discharge:  No,  Reason:  he has a Tejeda  Patient able to tolerate oral intake:  yes  Pain has adequate pain control using Oral analgesics:  Yes  Does patient have an identified :  Yes  Has goal D/C date and time been discussed with patient:  No  Patient has Hemovac in place. Has  Tejeda.

## 2022-11-03 ENCOUNTER — APPOINTMENT (OUTPATIENT)
Dept: PHYSICAL THERAPY | Facility: CLINIC | Age: 82
DRG: 455 | End: 2022-11-03
Attending: ORTHOPAEDIC SURGERY
Payer: MEDICARE

## 2022-11-03 ENCOUNTER — APPOINTMENT (OUTPATIENT)
Dept: OCCUPATIONAL THERAPY | Facility: CLINIC | Age: 82
DRG: 455 | End: 2022-11-03
Attending: ORTHOPAEDIC SURGERY
Payer: MEDICARE

## 2022-11-03 ENCOUNTER — APPOINTMENT (OUTPATIENT)
Dept: CARDIOLOGY | Facility: CLINIC | Age: 82
DRG: 455 | End: 2022-11-03
Attending: STUDENT IN AN ORGANIZED HEALTH CARE EDUCATION/TRAINING PROGRAM
Payer: MEDICARE

## 2022-11-03 LAB
ANION GAP SERPL CALCULATED.3IONS-SCNC: 7 MMOL/L (ref 7–15)
BUN SERPL-MCNC: 12.8 MG/DL (ref 8–23)
CALCIUM SERPL-MCNC: 8.5 MG/DL (ref 8.8–10.2)
CHLORIDE SERPL-SCNC: 102 MMOL/L (ref 98–107)
CREAT SERPL-MCNC: 0.92 MG/DL (ref 0.67–1.17)
DEPRECATED HCO3 PLAS-SCNC: 27 MMOL/L (ref 22–29)
ERYTHROCYTE [DISTWIDTH] IN BLOOD BY AUTOMATED COUNT: 11.4 % (ref 10–15)
GFR SERPL CREATININE-BSD FRML MDRD: 84 ML/MIN/1.73M2
GLUCOSE BLDC GLUCOMTR-MCNC: 110 MG/DL (ref 70–99)
GLUCOSE BLDC GLUCOMTR-MCNC: 118 MG/DL (ref 70–99)
GLUCOSE BLDC GLUCOMTR-MCNC: 126 MG/DL (ref 70–99)
GLUCOSE BLDC GLUCOMTR-MCNC: 140 MG/DL (ref 70–99)
GLUCOSE BLDC GLUCOMTR-MCNC: 141 MG/DL (ref 70–99)
GLUCOSE BLDC GLUCOMTR-MCNC: 156 MG/DL (ref 70–99)
GLUCOSE SERPL-MCNC: 156 MG/DL (ref 70–99)
HCT VFR BLD AUTO: 35.6 % (ref 40–53)
HGB BLD-MCNC: 10.2 G/DL (ref 13.3–17.7)
HGB BLD-MCNC: 11.2 G/DL (ref 13.3–17.7)
LVEF ECHO: NORMAL
MCH RBC QN AUTO: 31.5 PG (ref 26.5–33)
MCHC RBC AUTO-ENTMCNC: 31.5 G/DL (ref 31.5–36.5)
MCV RBC AUTO: 100 FL (ref 78–100)
PLATELET # BLD AUTO: 297 10E3/UL (ref 150–450)
POTASSIUM SERPL-SCNC: 4.2 MMOL/L (ref 3.4–5.3)
RBC # BLD AUTO: 3.56 10E6/UL (ref 4.4–5.9)
SODIUM SERPL-SCNC: 136 MMOL/L (ref 136–145)
TROPONIN T SERPL HS-MCNC: 13 NG/L
WBC # BLD AUTO: 12.7 10E3/UL (ref 4–11)

## 2022-11-03 PROCEDURE — 93010 ELECTROCARDIOGRAM REPORT: CPT | Performed by: INTERNAL MEDICINE

## 2022-11-03 PROCEDURE — 120N000001 HC R&B MED SURG/OB

## 2022-11-03 PROCEDURE — 80048 BASIC METABOLIC PNL TOTAL CA: CPT | Performed by: INTERNAL MEDICINE

## 2022-11-03 PROCEDURE — 99233 SBSQ HOSP IP/OBS HIGH 50: CPT | Performed by: STUDENT IN AN ORGANIZED HEALTH CARE EDUCATION/TRAINING PROGRAM

## 2022-11-03 PROCEDURE — 97530 THERAPEUTIC ACTIVITIES: CPT | Mod: GP | Performed by: PHYSICAL THERAPIST

## 2022-11-03 PROCEDURE — 85018 HEMOGLOBIN: CPT | Performed by: PHYSICIAN ASSISTANT

## 2022-11-03 PROCEDURE — 93005 ELECTROCARDIOGRAM TRACING: CPT

## 2022-11-03 PROCEDURE — 36415 COLL VENOUS BLD VENIPUNCTURE: CPT | Performed by: INTERNAL MEDICINE

## 2022-11-03 PROCEDURE — 258N000003 HC RX IP 258 OP 636: Performed by: PHYSICIAN ASSISTANT

## 2022-11-03 PROCEDURE — 97161 PT EVAL LOW COMPLEX 20 MIN: CPT | Mod: GP | Performed by: PHYSICAL THERAPIST

## 2022-11-03 PROCEDURE — 250N000011 HC RX IP 250 OP 636: Performed by: PHYSICIAN ASSISTANT

## 2022-11-03 PROCEDURE — 250N000013 HC RX MED GY IP 250 OP 250 PS 637: Performed by: PHYSICIAN ASSISTANT

## 2022-11-03 PROCEDURE — 97535 SELF CARE MNGMENT TRAINING: CPT | Mod: GO | Performed by: REHABILITATION PRACTITIONER

## 2022-11-03 PROCEDURE — 250N000013 HC RX MED GY IP 250 OP 250 PS 637: Performed by: ORTHOPAEDIC SURGERY

## 2022-11-03 PROCEDURE — 93306 TTE W/DOPPLER COMPLETE: CPT | Mod: 26 | Performed by: INTERNAL MEDICINE

## 2022-11-03 PROCEDURE — 84484 ASSAY OF TROPONIN QUANT: CPT | Performed by: INTERNAL MEDICINE

## 2022-11-03 PROCEDURE — 85027 COMPLETE CBC AUTOMATED: CPT | Performed by: INTERNAL MEDICINE

## 2022-11-03 PROCEDURE — 93306 TTE W/DOPPLER COMPLETE: CPT

## 2022-11-03 PROCEDURE — 97116 GAIT TRAINING THERAPY: CPT | Mod: GP | Performed by: PHYSICAL THERAPIST

## 2022-11-03 PROCEDURE — 97165 OT EVAL LOW COMPLEX 30 MIN: CPT | Mod: GO | Performed by: REHABILITATION PRACTITIONER

## 2022-11-03 PROCEDURE — 36415 COLL VENOUS BLD VENIPUNCTURE: CPT | Performed by: PHYSICIAN ASSISTANT

## 2022-11-03 PROCEDURE — 82962 GLUCOSE BLOOD TEST: CPT

## 2022-11-03 RX ORDER — HYDROXYZINE HYDROCHLORIDE 10 MG/1
10 TABLET, FILM COATED ORAL EVERY 6 HOURS PRN
Qty: 30 TABLET | Refills: 0 | Status: SHIPPED | OUTPATIENT
Start: 2022-11-03

## 2022-11-03 RX ORDER — HYDROMORPHONE HYDROCHLORIDE 2 MG/1
2-4 TABLET ORAL EVERY 4 HOURS PRN
Qty: 28 TABLET | Refills: 0 | Status: SHIPPED | OUTPATIENT
Start: 2022-11-03

## 2022-11-03 RX ORDER — ACETAMINOPHEN 325 MG/1
650 TABLET ORAL EVERY 4 HOURS PRN
Qty: 100 TABLET | Refills: 0 | Status: SHIPPED | OUTPATIENT
Start: 2022-11-03

## 2022-11-03 RX ORDER — METFORMIN HCL 500 MG
500 TABLET, EXTENDED RELEASE 24 HR ORAL
Status: CANCELLED | OUTPATIENT
Start: 2022-11-03

## 2022-11-03 RX ORDER — VITAMIN B COMPLEX
25 TABLET ORAL 2 TIMES DAILY
Qty: 180 TABLET | Refills: 0 | Status: SHIPPED | OUTPATIENT
Start: 2022-11-03

## 2022-11-03 RX ORDER — AMOXICILLIN 250 MG
1-2 CAPSULE ORAL 2 TIMES DAILY
Qty: 30 TABLET | Refills: 0 | Status: SHIPPED | OUTPATIENT
Start: 2022-11-03

## 2022-11-03 RX ADMIN — POLYETHYLENE GLYCOL 3350 17 G: 17 POWDER, FOR SOLUTION ORAL at 11:27

## 2022-11-03 RX ADMIN — ACETAMINOPHEN 975 MG: 325 TABLET, FILM COATED ORAL at 12:43

## 2022-11-03 RX ADMIN — CEPHALEXIN 250 MG: 250 CAPSULE ORAL at 18:49

## 2022-11-03 RX ADMIN — Medication 25 MCG: at 20:43

## 2022-11-03 RX ADMIN — SODIUM CHLORIDE: 9 INJECTION, SOLUTION INTRAVENOUS at 19:44

## 2022-11-03 RX ADMIN — METHOCARBAMOL 500 MG: 500 TABLET ORAL at 12:43

## 2022-11-03 RX ADMIN — HYDROXYZINE HYDROCHLORIDE 10 MG: 10 TABLET ORAL at 00:35

## 2022-11-03 RX ADMIN — PANTOPRAZOLE SODIUM 40 MG: 40 TABLET, DELAYED RELEASE ORAL at 11:27

## 2022-11-03 RX ADMIN — HYDROMORPHONE HYDROCHLORIDE 2 MG: 2 TABLET ORAL at 05:09

## 2022-11-03 RX ADMIN — ACETAMINOPHEN 975 MG: 325 TABLET, FILM COATED ORAL at 20:44

## 2022-11-03 RX ADMIN — FINASTERIDE 5 MG: 5 TABLET, FILM COATED ORAL at 22:35

## 2022-11-03 RX ADMIN — HYDROMORPHONE HYDROCHLORIDE 2 MG: 2 TABLET ORAL at 09:10

## 2022-11-03 RX ADMIN — HYDROMORPHONE HYDROCHLORIDE 4 MG: 2 TABLET ORAL at 14:18

## 2022-11-03 RX ADMIN — SODIUM CHLORIDE: 9 INJECTION, SOLUTION INTRAVENOUS at 00:28

## 2022-11-03 RX ADMIN — Medication 1 TABLET: at 18:49

## 2022-11-03 RX ADMIN — SENNOSIDES AND DOCUSATE SODIUM 1 TABLET: 50; 8.6 TABLET ORAL at 11:27

## 2022-11-03 RX ADMIN — ACETAMINOPHEN 975 MG: 325 TABLET, FILM COATED ORAL at 05:08

## 2022-11-03 RX ADMIN — SENNOSIDES AND DOCUSATE SODIUM 1 TABLET: 50; 8.6 TABLET ORAL at 20:43

## 2022-11-03 RX ADMIN — Medication 25 MCG: at 11:27

## 2022-11-03 RX ADMIN — Medication 1 TABLET: at 11:27

## 2022-11-03 RX ADMIN — HYDROMORPHONE HYDROCHLORIDE 4 MG: 2 TABLET ORAL at 20:43

## 2022-11-03 RX ADMIN — CEFAZOLIN 1 G: 1 INJECTION, POWDER, FOR SOLUTION INTRAMUSCULAR; INTRAVENOUS at 00:25

## 2022-11-03 RX ADMIN — TAMSULOSIN HYDROCHLORIDE 0.4 MG: 0.4 CAPSULE ORAL at 20:44

## 2022-11-03 ASSESSMENT — ACTIVITIES OF DAILY LIVING (ADL)
ADLS_ACUITY_SCORE: 25
ADLS_ACUITY_SCORE: 24
ADLS_ACUITY_SCORE: 24
ADLS_ACUITY_SCORE: 25
ADLS_ACUITY_SCORE: 24
ADLS_ACUITY_SCORE: 25
ADLS_ACUITY_SCORE: 24

## 2022-11-03 NOTE — PROGRESS NOTES
Physical therapy was with patient. Pt walked with PT in the hallways, went to PT room to sit when pt stated he was feeling dizzy. PT grabbed vital machine to get vitals and pt started turning pale, became lethargic, and then unresponsive. First BP was 80/40 and HR 46. Put pt on O2 and sats were 100%. Called rapid. Got fluids started. Pt came to, and stated the floor was wet. Pt had urinated on the floor. Next BP was 114/50 once fluids had been started. Got pt in lift sling to get into bed, lift did not fit over bed and had to bring pt to room in lift. Once in room /54. Labs ordered, CBC and troponin. EKG, tele, and TTE ordered. Next BP was 135/55. LR started @125, per verbal order from hospitalitis. Pt a/o x4, on room air. Pt stated he was still in pain, PRN dilaudid given. Continue oximeter on. Pt stated a similar situation like this happened when he had knee replacement. Pt a/o, watching TV, imaging in room for echo.

## 2022-11-03 NOTE — PROGRESS NOTES
"Ortho Rounding Note    S: In bed, pain controlled. Has ambulated in hallway and tolerated well. Denies SOB, CP, n/v/f/c, n/t to bilateral LE.     O:  Vital signs:   Blood pressure 109/54, pulse (!) 48, temperature 97.4  F (36.3  C), temperature source Temporal, resp. rate 18, height 1.715 m (5' 7.5\"), weight 73.4 kg (161 lb 14.4 oz), SpO2 97 %.  Estimated body mass index is 24.98 kg/m  as calculated from the following:    Height as of this encounter: 1.715 m (5' 7.5\").    Weight as of this encounter: 73.4 kg (161 lb 14.4 oz).      Intake/Output Summary (Last 24 hours) at 11/3/2022 0748  Last data filed at 11/3/2022 0513  Gross per 24 hour   Intake 1600 ml   Output 3025 ml   Net -1425 ml         Drain intact  Dressings c/d/i  5/5 motor and SPLT in BL UE and LE    A:  POD #1 s/p L4-5 TLIF    P:  General: Doing well this AM. Ambulated in halls and tolerated well. Continue with planned PT/OT and daily cares.   Pain: PO  Act: up ad rafael, with therapy  DVT: [facility] only  ID: routine postop abx to be completed 24 hours after surgery  Dispo: Plan to discharge to home later today with family. OK to DC once passes PT, medically stable, tolerating food, urinating  Appreciate Medicine consult for medical management    Dc Lombardi PA-C    "

## 2022-11-03 NOTE — PLAN OF CARE
A/O, VSS dilaudid, atarax and scheduled tylenol for pain control.  Tejeda catheter dcd DTV.  Hemovac output 175cc.  Dressing CDI CMS+ Ice applied to back

## 2022-11-03 NOTE — PLAN OF CARE
A&Ox4. IV infusing NS. Lungs clear. Bowel sounds active; not passing gas. CMS intact. Drsg CDI. Hemovac compressed and patent and draining.    Patient vital signs are at baseline: No,  Reason:  On 2L of O2.   Patient able to ambulate as they were prior to admission or with assist devices provided by therapies during their stay:  Yes, Ax1 using the walker and gait belt and brace on when OOB. Ambulated in the leggett this evening.   Patient MUST void prior to discharge:  No,  Reason:  Tejeda catheter patent and draining; to be discontinued POD1.   Patient able to tolerate oral intake:  Yes  Pain has adequate pain control using Oral analgesics:  Yes, taking PO Dilaudid, Robaxin, and Atarax.   Does patient have an identified :  Yes  Has goal D/C date and time been discussed with patient:  Yes     Goal Outcome Evaluation:      Plan of Care Reviewed With: patient    Overall Patient Progress: improving    Outcome Evaluation: Pt anticipating discharge in 1-2 days.

## 2022-11-03 NOTE — DISCHARGE INSTRUCTIONS
Incision Instructions    Your incision is covered with an Aquacel dressing. This is a waterproof dressing that you are able to shower with. Do not submerge your dressing in water. You should remove your dressing 7 days following your surgery to inspect your incision for infection: Redness, warmth, drainage.     However, if you notice a significant amount of drainage on your dressing, or there is any concern for possible incision infection, remove to inspect the incision prior to the 7 days.    If there is no concern for infection, cover with simple dressing. We suggest a folded piece of gauze with a few pieces of tape to hold in place. This will allow the incision to remain protected. Please make sure to cover your dressing with plastic/waterproof dressing to keep it waterproof while in the shower. We ask that you continue to waterproof it until you are seen at your two week post op appointment.     Activity Instructions   Minimize bending, lifting, twisting. No lifting greater than 10 lbs. Remember, 1 gallon of milk is 8 lbs.    Constipation  You were discharge with two medications that can help with constipation; Senokot and Milk of Magnesia. You should use Senokot daily as instructed while you are taking narcotics. If you continue to remain without a bowel movement for 2 days, begin taking Milk of Magnesia.     Please call our office at [phone] should you develop the following issues:  1.) Increased/persistent redness, bleeding, localized warmth, increased swelling, and/or drainage (yellow/clear/odorous) incision site.   2.) Increased pain not controlled with oral pain medications   3.) Persistent headache, dizziness, lightheaded  4.) Persistent constipation despite taking OTC stool softeners as directed  5.) Calf pain/swollen/hard/warm area, swelling chest pain or shortness of breath  6.) Increased/persistent numbness or tingling in arm or legs, weakness in extremities or falls  7.) Generalized feelings of  illness  8.) Persistent fever, chills, sweats, Temp 101 or greater  9.) Trouble voiding, incontinence of bowel and/or bladder  10.) Too sleepy - could be amount of pain medication  11.) If unable to wake call 911    Other instructions:  1.) No heavy lifting, nothing more than 6-10lbs. Minimize your bending, lifting, and twisting. Attempt to avoid prolonged period of sitting. Follow physical therapy restrictions and exercises - slowly increase your activity.   2.) Avoid sitting or laying in one position too long, walk as tolerated, log roll  3.) Wear brace when up per physical therapy, inspect skin under brace daily and call md if sore area starts  4.) Take an over the counter stool softener as directed while on narcotics to prevent constipation or to stay regular. Take a suppository or laxative if no bowel movement in 2 days despite taking softener     Follow Up:  Follow up with Dc Lombardi PA-C in two weeks at [facility]. Please call [facility] [phone] to schedule.

## 2022-11-03 NOTE — PROGRESS NOTES
11/03/22 3254   Appointment Info   Signing Clinician's Name / Credentials (OT) Kori Patel, OTR/L   Quick Adds   Quick Adds Certification   Living Environment   People in Home spouse   Current Living Arrangements house  (in [place])   Home Accessibility stairs to enter home   Number of Stairs, Main Entrance 1   Transportation Anticipated car, drives self   Living Environment Comments Pt staying at his daughters for 2 weeks in house, only patient and spouse will be there, dtr is in [place]. Patient does have 2 dtrs (one lives in [facility] and another in [place]). 3 FREDRICK no railing. No stairs necessary inside home. Family can provide help as needed. will use walk in shower and higher then SHT.   Self-Care   Usual Activity Tolerance excellent   Current Activity Tolerance moderate   Regular Exercise Yes   Activity/Exercise Type walking  (golf in winter in [place] and in summer)   Exercise Amount/Frequency 3-5 times/wk   Equipment Currently Used at Home none   Fall history within last six months no   Activity/Exercise/Self-Care Comment has walker at home   Instrumental Activities of Daily Living (IADL)   IADL Comments spouse to complete as needed, pt prefers to drives. Reports surgery team said he should be able to drive home in 2 weeks, can stay longer in [place], if needed.   General Information   Onset of Illness/Injury or Date of Surgery 11/02/22   Referring Physician Dc Lombardi PA-C/Russ Toure MD   Patient/Family Therapy Goal Statement (OT) to dc to dr home   Additional Occupational Profile Info/Pertinent History of Current Problem s/p L4-5 lumbar fusion   Existing Precautions/Restrictions spinal  (no brace needed OOB per surgeon conversation to nurse, brace order still in chart)   General Observations and Info patient seated in chair, agreeable to OT session   Cognitive Status Examination   Orientation Status orientation to person, place and time   Visual Perception   Visual Impairment/Limitations corrective  lenses full-time   Pain Assessment   Patient Currently in Pain Yes, see Vital Sign flowsheet  (pain varies with activity, 10/10 with position transitions, 4/10 while resting)   Range of Motion Comprehensive   General Range of Motion bilateral upper extremity ROM WFL   Strength Comprehensive (MMT)   Comment, General Manual Muscle Testing (MMT) Assessment decreased activity tolerance to be expected   Muscle Tone Assessment   Muscle Tone Quick Adds No deficits were identified   Coordination   Upper Extremity Coordination No deficits were identified   Bed Mobility   Comment (Bed Mobility) Min A and vc's   Transfers   Transfer Comments CGA, fww sit<>stand, SBA, fww functional mobility   Balance   Balance Comments LOB was not noted   Activities of Daily Living   BADL Assessment/Intervention lower body dressing;upper body dressing;toileting;bathing   Bathing Assessment/Intervention   [place] Level (Bathing) moderate assist (50% patient effort)   Upper Body Dressing Assessment/Training   [place] Level (Upper Body Dressing) minimum assist (75% patient effort)   Lower Body Dressing Assessment/Training   [place] Level (Lower Body Dressing) moderate assist (50% patient effort)   Toileting   [place] Level (Toileting) contact guard assist   Clinical Impression   Criteria for Skilled Therapeutic Interventions Met (OT) Yes, treatment indicated   OT Diagnosis decreased ADL/IADL   OT Problem List-Impairments impacting ADL activity tolerance impaired;balance;post-surgical precautions;pain;strength;range of motion (ROM)   Assessment of Occupational Performance 5 or more Performance Deficits   Identified Performance Deficits dsg, toileting, bathing, fucntional/community mobility, driving, errands   Planned Therapy Interventions (OT) ADL retraining;progressive activity/exercise;transfer training   Clinical Decision Making Complexity (OT) low complexity   Anticipated Equipment Needs Upon Discharge (OT) raised toilet  seat  (LHS, toileting aid)   Risk & Benefits of therapy have been explained evaluation/treatment results reviewed;care plan/treatment goals reviewed;risks/benefits reviewed;participants voiced agreement with care plan;patient   OT Total Evaluation Time   OT Eval, Low Complexity Minutes (11973) 8   Therapy Certification   Medical Diagnosis L4-5 lumbar fusion   Start of Care Date 11/03/22   Certification date from 11/03/22   Certification date to 11/04/22   OT Goals   Therapy Frequency (OT) Daily   OT Predicted Duration/Target Date for Goal Attainment 11/04/22   OT Goals Upper Body Dressing;Lower Body Dressing;Transfers;Toilet Transfer/Toileting;OT Goal 1   OT: Upper Body Dressing within precautions;Supervision/stand-by assist   OT: Lower Body Dressing Minimal assist;using adaptive equipment;within precautions   OT: Transfer Supervision/stand-by assist;with assistive device;within precautions  (walk in shower)   OT: Toilet Transfer/Toileting Supervision/stand-by assist;toilet transfer;cleaning and garment management;using adaptive equipment;within precautions   OT Discharge Planning   OT Plan walk in shower, TB dressing, toileting aid assessment   OT Discharge Recommendation (DC Rec) home   OT Rationale for DC Rec Anticipate patient will meet needed goals for safe discharge home with family to A as needed with IADL's; household chores, driving, errands, cooking and bathing. Recommended AE; toileting aid and LHS, patient has all other needed AE.   OT Brief overview of current status CGA, fww sit<>stand and bed mobility, toileting   Total Session Time   Total Session Time (sum of timed and untimed services) [address] [place] [address] Rehabilitation Services  OUTPATIENT OCCUPATIONAL THERAPY  EVALUATION  PLAN OF TREATMENT FOR OUTPATIENT REHABILITATION  (COMPLETE FOR INITIAL CLAIMS ONLY)  Patient's Last Name, First Name, M.I.  YOB: 1940  Leo Morelos                          Provider's Name  [facility]  [facility] Medical Record No.  [id number]                             Onset Date:  11/02/22   Start of Care Date:  11/03/22   Type:     ___PT   _X_OT   ___SLP Medical Diagnosis:  L4-5 lumbar fusion                    OT Diagnosis:  decreased ADL/IADL Visits from SOC:  1     See note for plan of treatment, functional goals and certification details    I CERTIFY THE NEED FOR THESE SERVICES FURNISHED UNDER        THIS PLAN OF TREATMENT AND WHILE UNDER MY CARE     (Physician co-signature of this document indicates review and certification of the therapy plan).

## 2022-11-03 NOTE — PROGRESS NOTES
11/03/22 0820   Appointment Info   Signing Clinician's Name / Credentials (PT) OPAL Owens   Student Supervision On-site supervision provided;Direct supervision provided;Line of sight supervision provided;Direct Patient Contact Provided;Therapy services provided with the co-signing licensed therapist guiding and directing the services, and providing the skilled judgement and assessment throughout the session  (Velvet Lynne, PT)   Living Environment   Living Environment Comments Pt staying with daughters for 2 weeks in house. 3 FREDRICK no railing. No stairs necessary inside home. Family can provide help as needed.   Self-Care   Usual Activity Tolerance excellent   Current Activity Tolerance good   Regular Exercise Yes   Activity/Exercise Type walking   Exercise Amount/Frequency 3-5 times/wk   Equipment Currently Used at Home none   Fall history within last six months no   Activity/Exercise/Self-Care Comment has walker at home   General Information   Onset of Illness/Injury or Date of Surgery 11/02/22   Referring Physician Dc Lombardi PA-C   Patient/Family Therapy Goals Statement (PT) return home   Pertinent History of Current Problem (include personal factors and/or comorbidities that impact the POC) impaired mobility, s/p L4-5 lumbar fusion   Existing Precautions/Restrictions spinal;no pivoting or twisting;lifting   General Observations no brace needed OOB per surgeon conversation to nurse, brace order still in chart   Cognition   Affect/Mental Status (Cognition) WNL   Orientation Status (Cognition) oriented x 4   Follows Commands (Cognition) WNL   Pain Assessment   Patient Currently in Pain Yes, see Vital Sign flowsheet   Integumentary/Edema   Integumentary/Edema no deficits were identifed   Posture    Posture Forward head position;Protracted shoulders   Range of Motion (ROM)   Range of Motion ROM deficits secondary to surgical procedure   ROM Comment spinal precautions, all other motions WFL    Strength (Manual Muscle Testing)   Strength (Manual Muscle Testing) Deficits observed during functional mobility   Strength Comments mild functional weakness   Bed Mobility   Comment, (Bed Mobility) Yajaira for bed mobility   Transfers   Comment, (Transfers) CGA for sit<>stand with FWW   Gait/Stairs (Locomotion)   Distance in Feet (Required for LE Total Joints) 10'   Distance in Feet (Gait) 200'   Comment, (Gait/Stairs) SBA and FWW for ambulation   Balance   Balance Comments requires FWW for balance   Sensory Examination   Sensory Perception patient reports no sensory changes   Coordination   Coordination no deficits were identified   Muscle Tone   Muscle Tone no deficits were identified   Clinical Impression   Criteria for Skilled Therapeutic Intervention Yes, treatment indicated   PT Diagnosis (PT) impaired mobility s/p lumbar fusion   Influenced by the following impairments spinal precautions, impaired balance, decreased strength and ROM due to procedure   Functional limitations due to impairments impaired bed mobility, transfers, ambulation, stairs   Clinical Presentation (PT Evaluation Complexity) Stable/Uncomplicated   Clinical Presentation Rationale clinical judgement, chart review   Clinical Decision Making (Complexity) low complexity   Planned Therapy Interventions (PT) balance training;gait training;bed mobility training;home exercise program;patient/family education;ROM (range of motion);stair training;strengthening;transfer training;progressive activity/exercise;risk factor education;home program guidelines   Anticipated Equipment Needs at Discharge (PT) shower chair;walker, rolling  (has walker at home)   Risk & Benefits of therapy have been explained evaluation/treatment results reviewed;care plan/treatment goals reviewed;risks/benefits reviewed;current/potential barriers reviewed;participants voiced agreement with care plan;participants included;patient   PT Total Evaluation Time   PT Eval, Low  Complexity Minutes (66720) 8   Plan of Care Review   Plan of Care Reviewed With patient   Physical Therapy Goals   PT Frequency Daily   PT Predicted Duration/Target Date for Goal Attainment 11/05/22   PT Goals Bed Mobility;Transfers;Gait;Stairs   PT: Bed Mobility Supervision/stand-by assist;Supine to/from sit   PT: Transfers Supervision/stand-by assist;Sit to/from stand;Assistive device   PT: Gait Supervision/stand-by assist;Standard walker;150 feet   PT: Stairs Supervision/stand-by assist;3 stairs;Assistive device   Interventions   Interventions Quick Adds Gait Training;Therapeutic Activity   Therapeutic Activity   Therapeutic Activities: dynamic activities to improve functional performance Minutes (92863) 25   Symptoms Noted During/After Treatment Dizziness;Fatigue;Shortness of breath;Significant change in vital signs   Treatment Detail/Skilled Intervention Pt was found supine in bed upon arrival, agreeable to PT. Patient was educated on spinal precautions, pt does not require brace OOB per ortho. Pt completed supine>sit with Yajaira and cues to maintain spinal precautions. No dizziness noted sitting EOB. Pt completed sit>stand with FWW and CGA, no dizziness or LOB noted. After ambulation pt took a seated rest break and began complaining of some dizziness. Vitals were taken immediately and BP read 80/40 and HR 45-55bpm. SpO2 were 100% despite rapid breathing. Pt began going in and out of conciousness, adult rapid response was called. Pt had episode of LOC for 4 min, incontinence episode, code was called. Pt was given fluids and tyesha lifted back to room. In care of medical team at end of session.   Gait Training   Gait Training Minutes (13139) 8   Symptoms Noted During/After Treatment (Gait Training) dizziness   Treatment Detail/Skilled Intervention Pt ambulated 200' with SBA and FWW. Pt required cues for standing upright and heel toe gait pattern. Pt demonstrated step through pattern and slow gait speed. Pt reported  minor dizziness at end of ambulation and took a seated break.   PT Discharge Planning   PT Plan PT: monitor vital signs, progress mobility, ambulation with w/c follow, stair trial   PT Discharge Recommendation (DC Rec)   (defer to ortho)   PT Rationale for DC Rec Anticipated pt will meet all goals by discharge. Pt has family that can help at home as able   PT Brief overview of current status Ax1 with FWW   Total Session Time   Timed Code Treatment Minutes 33   Total Session Time (sum of timed and untimed services) 41

## 2022-11-03 NOTE — PROGRESS NOTES
SPIRITUAL HEALTH SERVICES Progress Note  Ortho 6      Called to Pt in physical therapy gym regarding code blue page.      No spiritual needs. No family present.      SHS remains available upon request.    Logan Norwood M.Div.   Intern  Phone: [phone]

## 2022-11-03 NOTE — OP NOTE
Procedure Date: 11/02/2022    PREOPERATIVE DIAGNOSES:    1.  L4-L5 degenerative spondylolisthesis.  2.  Severe L4-L5 stenosis.  3.  Severe L4-L5 degenerative disk disease.  4.  Severe lumbar radiculopathy recalcitrant to conservative care.    POSTOPERATIVE DIAGNOSES:    1.  L4-L5 degenerative spondylolisthesis.  2.  Severe L4-L5 stenosis.  3.  Severe L4-L5 degenerative disk disease.  4.  Severe lumbar radiculopathy recalcitrant to conservative care.    PROCEDURES:    1.  L4-L5 transforaminal lumbar interbody fusion.  2.  Application of intervertebral biomechanical device for interbody fusion purposes.  3.  Separately performed posterior fusion from L4 to L5 using a posterior bone graft technique.  4.  Posterior instrumentation using bilateral pedicle screw and garcia construct spanning from L4 to L5.  5.  Central laminectomy L4.  6.  Central laminectomy L5.  7.  Local autograft bone.  8.  Bone graft extender with a 1 x 5 MagniFuse was placed in the bilateral gutters as part of the posterior fusion process.  9.  Fluoroscopy.  10.  Medtronic O-arm.    SURGEON:  Russ Toure MD.    ASSISTANT:  Dc Lombardi PA-C.    ANESTHESIA:  General endotracheal anesthesia without complication.    COMPLICATIONS:  None.    DRAINS:  One placed deep to the lumbar fascia prior to closure.    ESTIMATED BLOOD LOSS:  100 mL    FINDINGS:  Full decompression     COMPLICATIONS:  No complications.      COUNTS:  Were correct prior to closure.    INDICATIONS FOR PROCEDURE:  Mr. Morelos is an 82-year-old male who travels a to us today from [place], [place].  We have been following been following [facility] with chronic lumbar radiculopathy over the last several years.  In fact, he is very stoic.  He has been dealing with lumbar radiculopathy for quite some time despite conservative care including observation, medications, therapies, injections and so on.  Based on lack of conservative care, of course, based on MRI  findings, we discussed the above-mentioned procedure as a possible means to improve or alleviate his leg symptoms.  We discussed the risks, benefits and alternatives and ultimately he elected to proceed.  He had been presented with a consent form, which was read, understood and signed.    It should be noted that his imaging studies demonstrated grade 1 L4-L5 degenerative spondylolisthesis on standing x-rays.  MRI demonstrated central stenosis with severe bilateral lateral recess stenosis at L4-L5, very consistent with his symptoms.  He had rather dysplastic facet joints due to severe degenerative changes over the last 80 years.    DESCRIPTION OF PROCEDURE:  On the date of procedure, he was seen in the preoperative area.  Questions were answered.  Skin was marked.  Consent was signed by both parties.  After finding no contraindications to proceed with surgery in the preoperative anesthesia assessment, he was brought back to the operating room.  He was successfully sedated and an endotracheal tube was placed.  Tejeda catheter was placed under sterile conditions.  He was then repositioned prone over a 4-post frame on a Jose R table.  Eyes were free of compression.  SCDs were in place.  Shoulders and elbows were at 90 degrees with the shoulders slightly flexed forward.  The lumbar region was prepped and draped in standard fashion.  Timeout was performed and IV antibiotics were administered.    We localized over L4-L5.  The midline was marked and infiltrated with Marcaine with epinephrine.  A 10 blade was used to create a midline incision.  We dissected down through the skin and subcutaneous tissues to the lumbar fascia, which was then split in midline.  Subperiosteal dissection was completed over L4-L5.  We included the spinous processes of L3 in this as well.  A Kocher clamp was placed on our presumed L5 level.  Lateral x-ray was obtained, verified our level and we continued.  We reentered the wound at that point.  We  continued our exposure from L3 to L5.  The L3-L4 capsule was spared to the best of our ability, although due to the severe collapse of all the facet joints, there was likely some intrusion.  The facet capsule of L4-L5 was removed along with the numerous osteophytes surrounding the L4-L5 facet joint.  All bone graft material was placed on the back table and cleaned for later use in our fusion process.    A spinous process clamp was placed on L3.  The O-arm was brought into the room.  A spin was completed and information was saved.    We reentered the wound at that point.  A Stealth-guided drill was then used to create our  holes at L4 and L5 pedicles bilaterally.  This is with a 5.5 mm tap.  The pedicles were measured specifically.  Before placing our pedicle screws, we decorticated the bilateral gutters including the lateral facet joints, pars interarticularis as well as the transverse processes of L4 and L5.  We then placed a 1 x 5 MagniFuse in the bilateral gutters at L4-L5 as part of our posterior bony fusion process.  It should be noted that later in the case, prior to completion of our procedure we came back to backfill the gutters with local autograft bone, which had been later collected in our decompression process.    We then moved on to instrumentation, we placed our 4 pedicle screws.  Today, we used Medtronic Solera screws, 6.5 x 45 screws were accepted at L4 and 7.5 x 35 mm screws were accepted to L5.  These were placed under Stealth guidance with a Power Ease drill.  All 4 screws of course had an excellent bite.    I then moved on to our decompression.  I then stood from the patient's left side, I used a Leksell rongeur to remove the L4 and L5 spinous processes to a great degree.  I then switched over to a high-speed bur.  We placed a bone  on the suction line to collect our autograft bone during our decompression process with a drill.  The lamina was thinned over the ligamentum flavum  at L4-L5 from the origin to the insertion.  He was noted to have significantly hypertrophic and rather dysplastic facet joints required extra operative time and attention.  In any case, the lamina was thinned completely.  The ligamentum flavum was removed to expose the bilateral L5 nerve roots.    With the central laminectomy completed, we did place a D'Errico retractor over the left side of the thecal sac protecting the left L5 nerve root.  A cottonoid was placed proximally to protect the left L4 nerve root.  I approached the disk with a passive planar probe to identify its certain location.  I then used a 15 blade to produce an annulotomy.  We then moved on to our TLIF procedure.  We used our sequential jackie, curettes, pituitaries, and trials to perform a full diskectomy at L4-L5. Following trialing.  I then placed our final Medtronic Catalyft interbody expandable titanium cage size 7 x 23.  This was elevated to a very nice height. With manual testing, it was very snug within the disk space.  I then applied the inject applicator from the Medtronic DBF.  It should be noted that Medtronic DBF had been mixed with local autograft bone.  This was then inserted into the disk space for purposes of interbody fusion.  This completed our L4-L5 interbody fusion process.  The insertion device was removed.  I inspected the ventral epidural space and there was no evidence of ongoing compression.  We completed our instrumentation by placing two 35 mm rods spanning from L4 to L5.  Four end caps were applied and final tightened.  X-rays were obtained in AP and lateral projection, noting excellent instrumentation.  The wound was copiously irrigated.  We completed our posterior fusion by applying the remainder of our local autograft bone into the bilateral gutters spanning from L4 to L5 for the posterior bony fusion process.  Final irrigation was performed.  Final inspection was performed.  No complications.  All counts were  correct prior to closure.  The wound was closed in layers including the lumbar fascia, subcutaneous tissue and skin.  The wound was cleaned and dried.  Dermabond was applied.  Sterile dressings were applied.  Drapes were taken down.  He was then rolled back into supine position, extubated, and brought to the PACU in stable condition.    Dc Lombardi PA-C, was present from start to finish.  He was absolutely necessary.  We did use fluoroscopy and SlidePaytronic O-arm today.  We also used Medtronic instrumentation.  We used local autograft bone and Medtronic allograft bone including a DBF inject for the interbody fusion process.  No complications were identified today and once again counts were correct prior to closure.    Russ Toure MD        D: 2022   T: 2022   MT: PERCY    Name:     HITESH MORALES  MRN:      [id number]        Account:        [id number]   :      1940           Procedure Date: 2022     Document: [id number]

## 2022-11-03 NOTE — UTILIZATION REVIEW
Admission Status; Secondary Review Determination       Under the authority of the Utilization Management Committee, the utilization review process indicated a secondary review on the above patient. The review outcome is based on review of the medical records, discussions with staff, and applying clinical experience noted on the date of the review.     (x) Outpatient Status with extended recovery is appropriate - This patient does not meet hospital inpatient criteria. If this patient's primary payer is Medicare and was admitted as an inpatient, Condition Code 44 should be used and patient status changed to outpatient recovery.    RATIONALE FOR DETERMINATION   82 years old  man POD #1 s/p L4-5 TLIF . Patient was admitted to the hospital after the procedure. Patient has Medicare and the procedure is not on the CMS inpatient list. No documented complications or unexpected recovery. Patient can be safely  monitored for bleeding and recover in outpatient/extended recovery setting. The severity of illness, intensity of service provided, expected LOS and risk for adverse outcome doesn't meet inpatient hospital admission. Dc Lombardi PA-C mnotified.    This document was produced using voice recognition software.     The information on this document is developed by the utilization review team in order for the business office to ensure compliance. This only denotes the appropriateness of proper admission status and does not reflect the quality of care rendered.   The definitions of Inpatient Status and Observation Status used in making the determination above are those provided in the CMS Coverage Manual, Chapter 1 and Chapter 6, section 70.4.   Sincerely,   MERVAT TREVIZO MD   System Medical Director   Utilization Management   [facility].

## 2022-11-03 NOTE — PROGRESS NOTES
[facility]  Hospitalist Progress Note  Roverto Urrutia, DO 11/03/22       Reason for Stay (Diagnosis): L4-5 posterior fusion and laminectomies         Assessment and Plan:      Summary of Stay: Leo Morelos is a 82 year old male with a history of coronary artery disease status post THEO, dyslipidemia, hypertension, chronic prostatitis, who was admitted for lumbar 4-5 posterior instrumented fusion and laminectomies.     Problem List/Assessment and Plan:   Spinal Stenosis s/p lumbar 4-5 posterior lung instrumented fusion and laminectomies on 11/2: The patient is doing well, currently has well controlled pain and is hemodynamically stable. Will defer diet, activity, DVT prophylaxis, and pain control to the primary team. Currently the patient is on oxycodone, Robaxin, gabapentin. Continue physical and occupational therapy. Continue incentive spirometry and check hemoglobin to evaluate for surgical blood loss and potential need for transfusion,  cc.     Syncope:  RRT in then ultimately a CODE BLUE was called on the morning of 11/30 as the patient was about to work with physical therapy when he became lightheaded and had blurry vision and subsequently passed out.  Per report he was passed out for 3 to 4 minutes.  He then regained consciousness.  He then had quick return to his baseline status.  The patient reported that he had severe pain getting up with physical therapy, then had blurry vision then felt lightheaded and passed out.  He reports that this happened previously when he had knee surgery.  Most likely vasovagal syncope in the setting of severe pain versus orthostatic hypotension given known soft blood pressures thus far this hospitalization.  -Cardiac telemetry  -TTE  -Continue IV fluid resuscitation with NS at 100 cc/h  -Was set to discharge today but will monitor overnight  -Consider cardiac monitor on discharge     Type II DM  A1c well controlled ~6.2-6.5 per H&P. On metformin  "PTA.  -Sliding scale insulin  -Continue to hold metformin 11/2      CAD s/p JESSE  HLD  Stable, no cp or dyspnea.   Hx of jesse 2007, subsequent reassuring lexiscan latest in 2019. Per H&P follows with Cardiologist in [place].   -PTA ASA, ace, MP     HTN  Occasional hypotension  Patient has known history of hypertension for which he is on lisinopril and metoprolol.  -We are holding his PTA lisinopril given soft blood pressures and episode of syncope     Chronic Prostatitis  BPH  -PTAchronic ppx keflex prescribed by Urologist   -TOFIDEL per surgical team       DVT Prophylaxis: Defer to primary service  Code Status: Full Code  Discharge Dispo/Date: Anticipate discharge tomorrow 11/4 if the patient has an uneventful day and night.  Ultimate dispo to be planned by primary team.        Interval History (Subjective):      RRT and ultimate CODE BLUE was called this morning given episode of syncope that lasted for 3 to 5 minutes.  Patient then regained consciousness and was his usual self.  He reports that this is happened previously during her knee surgery.  He reports severe pain when he got up with PT today.  Reported that he got lightheaded, had blurry vision, and then passed out.  He denies chest pain, shortness of breath, headache, trauma.  Fortunately he was sitting down prior to passing out.  He did have urinary incontinence but no seizure-like activity.                  Physical Exam:      Last Vital Signs:  /50   Pulse 52   Temp 97.4  F (36.3  C) (Temporal)   Resp 16   Ht 1.715 m (5' 7.5\")   Wt 73.4 kg (161 lb 14.4 oz)   SpO2 99%   BMI 24.98 kg/m        Intake/Output Summary (Last 24 hours) at 11/3/2022 1300  Last data filed at 11/3/2022 1226  Gross per 24 hour   Intake 850 ml   Output 3225 ml   Net -2375 ml       General: Alert, awake, no acute distress.  HEENT: NC/AT, eyes anicteric, external occular movements intact, face symmetric.   Cardiac: RRR, S1, S2.  No murmurs appreciated.  Pulmonary: Normal work of " breathing.  Lungs CTAB.  Abdomen: soft, non-tender, non-distended.  Bowel sounds present.  No guarding.  Extremities: no deformities.  Warm, well perfused.  Skin: no rashes or lesions noted.  Warm and dry.  Neuro: No gross deficits noted.  Speech clear.    Psych: Appropriate affect.         Medications:      All current medications were reviewed with changes reflected in problem list.         Data:      All new lab and imaging data was reviewed.   Labs:       Lab Results   Component Value Date     2022    Lab Results   Component Value Date    CHLORIDE 102 2022    Lab Results   Component Value Date    BUN 12.8 2022      Lab Results   Component Value Date    POTASSIUM 4.2 2022    Lab Results   Component Value Date    CO2 27 2022    Lab Results   Component Value Date    CR 0.92 2022        Recent Labs   Lab 22  0853   WBC 12.7*   HGB 11.2*   HCT 35.6*            Imaging:   Recent Results (from the past 48 hour(s))   XR Surgery HARJIT L/T 5 Min Fluoro w Stills    Narrative    This exam was marked as non-reportable because it will not be read by a   radiologist or a [place] non-radiologist provider.         XR Surgery HARJIT L/T 5 Min Fluoro w Stills    Narrative    This exam was marked as non-reportable because it will not be read by a   radiologist or a [place] non-radiologist provider.         Echocardiogram Complete   Result Value    LVEF  60-65%    Narrative    [id number]  [id number]  [id number]  [id number]^GIANCARLO^OSMAN     [facility]  Echocardiography Laboratory  [address] [place] [address]     Name: HITESH MORALES  MRN: [id number]  : 1940  Study Date: 2022 09:16 AM  Age: 82 yrs  Gender: Male  Patient Location: [facility]  Reason For Study: Bradycardia - Sinus  Ordering Physician: OSMAN MONDRAGON  Performed By: Brittanie Kline     BSA: 1.9 m2  Height: 68 in  Weight: 161 lb  HR: 47  BP: 135/55  mmHg  ______________________________________________________________________________  Procedure  Complete Portable Echo Adult.  ______________________________________________________________________________  Interpretation Summary     Left ventricular systolic function is normal.  The visual ejection fraction is 60-65%.  No regional wall motion abnormalities noted.  The study was technically adequate. There is no comparison study available.  ______________________________________________________________________________  Left Ventricle  The left ventricle is normal in size. A sigmoid septum is present. Left  ventricular systolic function is normal. The visual ejection fraction is 60-  65%. Left ventricular diastolic function is indeterminate. No regional wall  motion abnormalities noted.     Right Ventricle  The right ventricle is normal size. The right ventricular systolic function is  normal.     Atria  The left atrium is mildly dilated. Right atrial size is normal.     Mitral Valve  There is trace mitral regurgitation.     Tricuspid Valve  There is trace tricuspid regurgitation. The right ventricular systolic  pressure is approximated at 29.7 mmHg plus the right atrial pressure. IVC  diameter <2.1 cm collapsing >50% with sniff suggests a normal RA pressure of 3  mmHg.     Aortic Valve  The aortic valve is trileaflet. No aortic regurgitation is present. No aortic  stenosis is present.     Pulmonic Valve  The pulmonic valve is not well visualized.     Vessels  The aortic root is normal size. The ascending aorta is Borderline dilated.     Pericardium  There is no pericardial effusion.     Rhythm  The rhythm was sinus bradycardia.  ______________________________________________________________________________  MMode/2D Measurements & Calculations     IVSd: 0.95 cm  LVIDd: 4.4 cm  LVIDs: 2.5 cm  LVPWd: 0.81 cm  FS: 43.7 %  LV mass(C)d: 122.5 grams  LV mass(C)dI: 65.7 grams/m2  Ao root diam: 3.1 cm  LA dimension: 4.1  cm  asc Aorta Diam: 3.8 cm  LA/Ao: 1.3  LVOT diam: 2.3 cm  LVOT area: 4.0 cm2  LA Volume (BP): 65.0 ml  LA Volume Index (BP): 34.9 ml/m2  RWT: 0.37     Doppler Measurements & Calculations  MV E max lacey: 114.9 cm/sec  MV A max lacey: 65.1 cm/sec  MV E/A: 1.8  MV dec time: 0.21 sec  LV V1 max PG: 3.7 mmHg  LV V1 max: 96.3 cm/sec  LV V1 VTI: 26.0 cm  SV(LVOT): 104.4 ml  SI(LVOT): 56.0 ml/m2  PA acc time: 0.13 sec  TR max lacey: 272.6 cm/sec  TR max P.7 mmHg  Medial E/e': 11.4     ______________________________________________________________________________  Report approved by: Kareem Mart 2022 10:18 AM             Roverto Urrutia DO

## 2022-11-04 ENCOUNTER — APPOINTMENT (OUTPATIENT)
Dept: PHYSICAL THERAPY | Facility: CLINIC | Age: 82
DRG: 455 | End: 2022-11-04
Attending: ORTHOPAEDIC SURGERY
Payer: MEDICARE

## 2022-11-04 ENCOUNTER — APPOINTMENT (OUTPATIENT)
Dept: OCCUPATIONAL THERAPY | Facility: CLINIC | Age: 82
DRG: 455 | End: 2022-11-04
Attending: ORTHOPAEDIC SURGERY
Payer: MEDICARE

## 2022-11-04 VITALS
BODY MASS INDEX: 24.54 KG/M2 | DIASTOLIC BLOOD PRESSURE: 55 MMHG | HEIGHT: 68 IN | TEMPERATURE: 98.6 F | RESPIRATION RATE: 16 BRPM | OXYGEN SATURATION: 96 % | SYSTOLIC BLOOD PRESSURE: 122 MMHG | HEART RATE: 70 BPM | WEIGHT: 161.9 LBS

## 2022-11-04 LAB
ATRIAL RATE - MUSE: 49 BPM
DIASTOLIC BLOOD PRESSURE - MUSE: NORMAL MMHG
GLUCOSE BLDC GLUCOMTR-MCNC: 123 MG/DL (ref 70–99)
GLUCOSE BLDC GLUCOMTR-MCNC: 128 MG/DL (ref 70–99)
GLUCOSE BLDC GLUCOMTR-MCNC: 131 MG/DL (ref 70–99)
INTERPRETATION ECG - MUSE: NORMAL
P AXIS - MUSE: 28 DEGREES
PR INTERVAL - MUSE: 162 MS
QRS DURATION - MUSE: 86 MS
QT - MUSE: 448 MS
QTC - MUSE: 404 MS
R AXIS - MUSE: 18 DEGREES
SYSTOLIC BLOOD PRESSURE - MUSE: NORMAL MMHG
T AXIS - MUSE: 43 DEGREES
VENTRICULAR RATE- MUSE: 49 BPM

## 2022-11-04 PROCEDURE — 250N000013 HC RX MED GY IP 250 OP 250 PS 637: Performed by: PHYSICIAN ASSISTANT

## 2022-11-04 PROCEDURE — 258N000003 HC RX IP 258 OP 636: Performed by: PHYSICIAN ASSISTANT

## 2022-11-04 PROCEDURE — 82962 GLUCOSE BLOOD TEST: CPT

## 2022-11-04 PROCEDURE — 97530 THERAPEUTIC ACTIVITIES: CPT | Mod: GP | Performed by: PHYSICAL THERAPIST

## 2022-11-04 PROCEDURE — 97116 GAIT TRAINING THERAPY: CPT | Mod: GP | Performed by: PHYSICAL THERAPIST

## 2022-11-04 PROCEDURE — 97535 SELF CARE MNGMENT TRAINING: CPT | Mod: GO

## 2022-11-04 RX ORDER — LISINOPRIL 20 MG/1
40 TABLET ORAL DAILY
COMMUNITY
Start: 2022-11-04

## 2022-11-04 RX ADMIN — Medication 1 TABLET: at 09:53

## 2022-11-04 RX ADMIN — HYDROMORPHONE HYDROCHLORIDE 4 MG: 2 TABLET ORAL at 03:40

## 2022-11-04 RX ADMIN — PANTOPRAZOLE SODIUM 40 MG: 40 TABLET, DELAYED RELEASE ORAL at 09:53

## 2022-11-04 RX ADMIN — POLYETHYLENE GLYCOL 3350 17 G: 17 POWDER, FOR SOLUTION ORAL at 09:53

## 2022-11-04 RX ADMIN — Medication 25 MCG: at 09:53

## 2022-11-04 RX ADMIN — HYDROXYZINE HYDROCHLORIDE 10 MG: 10 TABLET ORAL at 14:15

## 2022-11-04 RX ADMIN — SENNOSIDES AND DOCUSATE SODIUM 1 TABLET: 50; 8.6 TABLET ORAL at 09:53

## 2022-11-04 RX ADMIN — METOPROLOL SUCCINATE 50 MG: 50 TABLET, EXTENDED RELEASE ORAL at 09:53

## 2022-11-04 RX ADMIN — METHOCARBAMOL 500 MG: 500 TABLET ORAL at 06:13

## 2022-11-04 RX ADMIN — HYDROMORPHONE HYDROCHLORIDE 4 MG: 2 TABLET ORAL at 09:53

## 2022-11-04 RX ADMIN — ACETAMINOPHEN 975 MG: 325 TABLET, FILM COATED ORAL at 13:08

## 2022-11-04 RX ADMIN — MAGNESIUM HYDROXIDE 30 ML: 400 SUSPENSION ORAL at 09:53

## 2022-11-04 RX ADMIN — ACETAMINOPHEN 975 MG: 325 TABLET, FILM COATED ORAL at 06:09

## 2022-11-04 RX ADMIN — HYDROMORPHONE HYDROCHLORIDE 4 MG: 2 TABLET ORAL at 14:16

## 2022-11-04 RX ADMIN — SODIUM CHLORIDE: 9 INJECTION, SOLUTION INTRAVENOUS at 03:45

## 2022-11-04 RX ADMIN — METHOCARBAMOL 500 MG: 500 TABLET ORAL at 13:08

## 2022-11-04 RX ADMIN — Medication 1 TABLET: at 13:08

## 2022-11-04 ASSESSMENT — ACTIVITIES OF DAILY LIVING (ADL)
ADLS_ACUITY_SCORE: 25

## 2022-11-04 NOTE — PROGRESS NOTES
Physical Therapy Discharge Summary    Reason for therapy discharge:    All goals and outcomes met, no further needs identified.    Progress towards therapy goal(s). See goals on Care Plan in [facility] electronic health record for goal details.  Goals met    Therapy recommendation(s):    defer to ortho

## 2022-11-04 NOTE — UTILIZATION REVIEW
Admission Status; Secondary Review Determination       Under the authority of the Utilization Management Committee, the utilization review process indicated a secondary review on the above patient. The review outcome is based on review of the medical records, discussions with staff, and applying clinical experience noted on the date of the review.     (x) Inpatient Status Appropriate - This patient's medical care is consistent with medical management for inpatient care and reasonable inpatient medical practice.     RATIONALE FOR DETERMINATION     Leo Morelos is an 82 year old male with a history of coronary artery disease status post THEO, dyslipidemia, hypertension, chronic prostatitis, and back pain. He was admitted to [facility] on 11/2/22 after elective lumbar 4-5 posterior instrumented fusion and laminectomies. On post op day 1 hospital course was complicated by an episode of symptomatic bradycardia with syncope.  Over lost consciousness for 4 minutes.  Code rapid response was called. Ultimately code blue was called.  Leo was kept in the hospital for close monitoring on telemetry.  Echocardiogram was obtained and was unremarkable.  Inpatient admission is appropriate for spine surgery complicated by symptomatic bradycardia with syncope and loss of consciousness for close monitoring and further evaluation.      At the time of admission with the information available to the attending physician more than 2 nights Hospital complex care was anticipated, based on patient risk of adverse outcome if treated as outpatient and complex care required. Inpatient admission is appropriate based on the Medicare guidelines.     This document was produced using voice recognition software       The information on this document is developed by the utilization review team in order for the business office to ensure compliance. This only denotes the appropriateness of proper admission status and does not reflect the quality of care  rendered.   The definitions of Inpatient Status and Observation Status used in making the determination above are those provided in the CMS Coverage Manual, Chapter 1 and Chapter 6, section 70.4.   Sincerely,     Wesly Torre MD    Utilization Review  Physician Advisor  [facility].

## 2022-11-04 NOTE — PLAN OF CARE
Patient vital signs are at baseline: Yes  Patient able to ambulate as they were prior to admission or with assist devices provided by therapies during their stay:  Yes  Patient MUST void prior to discharge:  Yes  Patient able to tolerate oral intake:  Yes  Pain has adequate pain control using Oral analgesics:  Yes  Does patient have an identified :  Yes  Has goal D/C date and time been discussed with patient:  Yes  Goal Outcome Evaluation:         Pt is alert and oriented, lung sound clear,Tele monitor-SR, up with assist of one walker and gait.Tolerating regular diet, passing gas.pain controlled with prn Dilaudid/scheduled tylenol.Dressing to the back clean dry and intact, denies numbness and tingling. Blood sugar monitoring,Hemovac in place put out. Voiding in the bathroom. Plan to discharge to home possibly today.

## 2022-11-04 NOTE — PLAN OF CARE
Pt a/o x4. Pt went bradycardic this morning with PT, rapid was called (see prior note). Tele place, SB/SR. All other VSS. C/o pain in back, tylenol, dilaudid and robaxin given. Voiding adequately. Dressing and hemovac clean, dry, and intact. , 140, and 118. Walked again with PT this afternoon, BP and HR remained stable. Neuros intact. Passing gas. Continue monitoring BP and pain.

## 2022-11-04 NOTE — PLAN OF CARE
Patient vital signs are at baseline: Yes  Patient able to ambulate as they were prior to admission or with assist devices provided by therapies during their stay:  Yes  Patient MUST void prior to discharge:  Yes  Patient able to tolerate oral intake:  Yes  Pain has adequate pain control using Oral analgesics:  Yes  Does patient have an identified :  Yes  Has goal D/C date and time been discussed with patient:  Yes    Pain managed with PRN PO pain meds + cold.  No nausea.  LS clear bilat. RA, encouraged hourly CDB/IS.  CMS+.  Drsg CDI.  Hemovac DC.  Voiding, gas+.  Up SBA belt + walker, sat up recliner for meals, ambulating.  Reviewed discharge instructions and medications with patient and family, questions answered.  Patient discharged to [facility]'s  home with discharge instructions, medications (tylenol, os--calcium w/D, dilaudid, atarax, MOM, senna, vit. D), and belongings.   Left floor via w/c with staff, transported by [facility].

## 2022-11-04 NOTE — PLAN OF CARE
Occupational Therapy Discharge Summary    Reason for therapy discharge:    All goals and outcomes met, no further needs identified.    Progress towards therapy goal(s). See goals on Care Plan in [facility] electronic health record for goal details.  Goals partially met.  Barriers to achieving goals:   resistance from pt to participate with toilet transfer or shower transfer.    Therapy recommendation(s):    No further therapy is recommended.

## 2022-11-05 ENCOUNTER — HOSPITAL ENCOUNTER (EMERGENCY)
Facility: CLINIC | Age: 82
Discharge: HOME | End: 2022-11-05
Attending: EMERGENCY MEDICINE | Admitting: EMERGENCY MEDICINE
Payer: MEDICARE

## 2022-11-05 ENCOUNTER — APPOINTMENT (OUTPATIENT)
Dept: MRI IMAGING | Facility: CLINIC | Age: 82
End: 2022-11-05
Attending: EMERGENCY MEDICINE
Payer: MEDICARE

## 2022-11-05 ENCOUNTER — APPOINTMENT (OUTPATIENT)
Dept: GENERAL RADIOLOGY | Facility: CLINIC | Age: 82
End: 2022-11-05
Attending: EMERGENCY MEDICINE
Payer: MEDICARE

## 2022-11-05 VITALS
OXYGEN SATURATION: 97 % | SYSTOLIC BLOOD PRESSURE: 147 MMHG | TEMPERATURE: 98.9 F | RESPIRATION RATE: 18 BRPM | WEIGHT: 161.82 LBS | HEIGHT: 67 IN | BODY MASS INDEX: 25.4 KG/M2 | DIASTOLIC BLOOD PRESSURE: 79 MMHG | HEART RATE: 80 BPM

## 2022-11-05 DIAGNOSIS — R50.9 FEBRILE ILLNESS: ICD-10-CM

## 2022-11-05 DIAGNOSIS — Z48.89 ENCOUNTER FOR POST SURGICAL WOUND CHECK: ICD-10-CM

## 2022-11-05 LAB
ALBUMIN SERPL BCG-MCNC: 3.4 G/DL (ref 3.5–5.2)
ALBUMIN UR-MCNC: NEGATIVE MG/DL
ALP SERPL-CCNC: 54 U/L (ref 40–129)
ALT SERPL W P-5'-P-CCNC: 13 U/L (ref 10–50)
ANION GAP SERPL CALCULATED.3IONS-SCNC: 10 MMOL/L (ref 7–15)
APPEARANCE UR: CLEAR
AST SERPL W P-5'-P-CCNC: 24 U/L (ref 10–50)
BILIRUB SERPL-MCNC: 0.5 MG/DL
BILIRUB UR QL STRIP: NEGATIVE
BUN SERPL-MCNC: 11.1 MG/DL (ref 8–23)
CALCIUM SERPL-MCNC: 8.6 MG/DL (ref 8.8–10.2)
CHLORIDE SERPL-SCNC: 95 MMOL/L (ref 98–107)
COLOR UR AUTO: YELLOW
CREAT SERPL-MCNC: 0.92 MG/DL (ref 0.67–1.17)
DEPRECATED HCO3 PLAS-SCNC: 27 MMOL/L (ref 22–29)
ERYTHROCYTE [DISTWIDTH] IN BLOOD BY AUTOMATED COUNT: 11.2 % (ref 10–15)
FLUAV RNA SPEC QL NAA+PROBE: NEGATIVE
FLUBV RNA RESP QL NAA+PROBE: NEGATIVE
GFR SERPL CREATININE-BSD FRML MDRD: 84 ML/MIN/1.73M2
GLUCOSE SERPL-MCNC: 143 MG/DL (ref 70–99)
GLUCOSE UR STRIP-MCNC: NEGATIVE MG/DL
HCT VFR BLD AUTO: 33 % (ref 40–53)
HGB BLD-MCNC: 10.8 G/DL (ref 13.3–17.7)
HGB UR QL STRIP: NEGATIVE
HOLD SPECIMEN: NORMAL
HOLD SPECIMEN: NORMAL
KETONES UR STRIP-MCNC: ABNORMAL MG/DL
LACTATE SERPL-SCNC: 0.9 MMOL/L (ref 0.7–2)
LEUKOCYTE ESTERASE UR QL STRIP: ABNORMAL
MCH RBC QN AUTO: 31.4 PG (ref 26.5–33)
MCHC RBC AUTO-ENTMCNC: 32.7 G/DL (ref 31.5–36.5)
MCV RBC AUTO: 96 FL (ref 78–100)
NITRATE UR QL: NEGATIVE
PH UR STRIP: 7.5 [PH] (ref 5–7)
PLATELET # BLD AUTO: 316 10E3/UL (ref 150–450)
POTASSIUM SERPL-SCNC: 4 MMOL/L (ref 3.4–5.3)
PROCALCITONIN SERPL IA-MCNC: 0.08 NG/ML
PROT SERPL-MCNC: 5.8 G/DL (ref 6.4–8.3)
RBC # BLD AUTO: 3.44 10E6/UL (ref 4.4–5.9)
RBC URINE: <1 /HPF
RSV RNA SPEC NAA+PROBE: NEGATIVE
SARS-COV-2 RNA RESP QL NAA+PROBE: NEGATIVE
SODIUM SERPL-SCNC: 132 MMOL/L (ref 136–145)
SP GR UR STRIP: 1.02 (ref 1–1.03)
UROBILINOGEN UR STRIP-MCNC: NORMAL MG/DL
WBC # BLD AUTO: 10.2 10E3/UL (ref 4–11)
WBC URINE: 8 /HPF

## 2022-11-05 PROCEDURE — 96374 THER/PROPH/DIAG INJ IV PUSH: CPT | Mod: 59

## 2022-11-05 PROCEDURE — 84145 PROCALCITONIN (PCT): CPT | Performed by: EMERGENCY MEDICINE

## 2022-11-05 PROCEDURE — 250N000011 HC RX IP 250 OP 636: Performed by: EMERGENCY MEDICINE

## 2022-11-05 PROCEDURE — 71046 X-RAY EXAM CHEST 2 VIEWS: CPT

## 2022-11-05 PROCEDURE — 85027 COMPLETE CBC AUTOMATED: CPT | Performed by: EMERGENCY MEDICINE

## 2022-11-05 PROCEDURE — 96361 HYDRATE IV INFUSION ADD-ON: CPT

## 2022-11-05 PROCEDURE — 36415 COLL VENOUS BLD VENIPUNCTURE: CPT | Performed by: EMERGENCY MEDICINE

## 2022-11-05 PROCEDURE — G1010 CDSM STANSON: HCPCS

## 2022-11-05 PROCEDURE — 87637 SARSCOV2&INF A&B&RSV AMP PRB: CPT | Performed by: EMERGENCY MEDICINE

## 2022-11-05 PROCEDURE — 80053 COMPREHEN METABOLIC PANEL: CPT | Performed by: EMERGENCY MEDICINE

## 2022-11-05 PROCEDURE — 258N000003 HC RX IP 258 OP 636: Performed by: EMERGENCY MEDICINE

## 2022-11-05 PROCEDURE — 83605 ASSAY OF LACTIC ACID: CPT | Performed by: EMERGENCY MEDICINE

## 2022-11-05 PROCEDURE — 81001 URINALYSIS AUTO W/SCOPE: CPT | Performed by: EMERGENCY MEDICINE

## 2022-11-05 PROCEDURE — 255N000002 HC RX 255 OP 636: Performed by: EMERGENCY MEDICINE

## 2022-11-05 PROCEDURE — 87040 BLOOD CULTURE FOR BACTERIA: CPT | Performed by: EMERGENCY MEDICINE

## 2022-11-05 PROCEDURE — 99285 EMERGENCY DEPT VISIT HI MDM: CPT | Mod: CS,25

## 2022-11-05 PROCEDURE — C9803 HOPD COVID-19 SPEC COLLECT: HCPCS

## 2022-11-05 PROCEDURE — 72158 MRI LUMBAR SPINE W/O & W/DYE: CPT | Mod: MF

## 2022-11-05 PROCEDURE — A9585 GADOBUTROL INJECTION: HCPCS | Performed by: EMERGENCY MEDICINE

## 2022-11-05 RX ORDER — GADOBUTROL 604.72 MG/ML
7.5 INJECTION INTRAVENOUS ONCE
Status: COMPLETED | OUTPATIENT
Start: 2022-11-05 | End: 2022-11-05

## 2022-11-05 RX ADMIN — SODIUM CHLORIDE 1000 ML: 9 INJECTION, SOLUTION INTRAVENOUS at 18:21

## 2022-11-05 RX ADMIN — HYDROMORPHONE HYDROCHLORIDE 1 MG: 1 INJECTION, SOLUTION INTRAMUSCULAR; INTRAVENOUS; SUBCUTANEOUS at 22:19

## 2022-11-05 RX ADMIN — GADOBUTROL 7.5 ML: 604.72 INJECTION INTRAVENOUS at 19:56

## 2022-11-05 ASSESSMENT — ENCOUNTER SYMPTOMS
FEVER: 1
CHILLS: 1
DIARRHEA: 0
CONSTIPATION: 1
COUGH: 0
VOMITING: 0
ARTHRALGIAS: 0

## 2022-11-05 ASSESSMENT — ACTIVITIES OF DAILY LIVING (ADL)
ADLS_ACUITY_SCORE: 35
ADLS_ACUITY_SCORE: 36

## 2022-11-05 NOTE — PLAN OF CARE
[facility]      OUTPATIENT PHYSICAL THERAPY EVALUATION  PLAN OF TREATMENT FOR OUTPATIENT REHABILITATION  (COMPLETE FOR INITIAL CLAIMS ONLY)  Patient's Last Name, First Name, M.I.  YOB: 1940  Leo Morelos                        Provider's Name  [facility] Medical Record No.  [id number]                               Onset Date:  11/02/22   Start of Care Date:  11/03/22      Type:     _X_PT   ___OT   ___SLP Medical Diagnosis:  L4-5 lumbar fusion                        PT Diagnosis:  impaired mobility s/p lumbar fusion   Visits from SOC:  1   _________________________________________________________________________________  Plan of Treatment/Functional Goals    Planned Interventions: balance training, gait training, bed mobility training, home exercise program, patient/family education, ROM (range of motion), stair training, strengthening, transfer training, progressive activity/exercise, risk factor education, home program guidelines     Goals: See Physical Therapy Goals on Care Plan in [facility] electronic health record.    Therapy Frequency: Daily  Predicted Duration of Therapy Intervention: 11/05/22  _________________________________________________________________________________    I CERTIFY THE NEED FOR THESE SERVICES FURNISHED UNDER        THIS PLAN OF TREATMENT AND WHILE UNDER MY CARE     (Physician co-signature of this document indicates review and certification of the therapy plan).              Certification date from: 11/03/22, Certification date to: 11/05/22    Referring Physician: Dc Lombardi PA-C            Initial Assessment        See Physical Therapy evaluation dated 11/03/22 in Epic electronic health record.

## 2022-11-05 NOTE — ED TRIAGE NOTES
Pt here w/ daughter and wife for fever that started today. Pt is 3 days post-op spinal fusion that was done here on 11/2. Tmax per daughter was 102 orally. Did take Tylenol 1g 1 hour pta. Last dose of dilaudid for post-op pain at 1500. Denies n/v/d. Is ambulatory w/ walker. Reports pain is minimal at rest.      Triage Assessment     Row Name 11/05/22 0112       Triage Assessment (Adult)    Airway WDL WDL       Respiratory WDL    Respiratory WDL WDL       Skin Circulation/Temperature WDL    Skin Circulation/Temperature WDL WDL       Cardiac WDL    Cardiac WDL WDL       Peripheral/Neurovascular WDL    Peripheral Neurovascular WDL WDL       Cognitive/Neuro/Behavioral WDL    Cognitive/Neuro/Behavioral WDL WDL

## 2022-11-05 NOTE — ED NOTES
Rapid Assessment Note    History:   Leo Morelos is a 82 year old male who presents with wife and daughter with a fever. Today, patient reports developing chills and fever after taking a nap. His forehead temperature measured 104.5F and oral temperature measured 102.2F. At 1630, he was given tylenol and his temperature lowered to 100.8F. Patient also endorses a sore throat and back pain but notes he had back surgery three days ago. He also took milk of magnesia today for constipation. Patient denies rhinorrhea, cough, congestion, dysuria.    Exam:   General:  Alert, interactive.  Diaphoretic.  Cardiovascular:  Well perfused.  Regular rate and rhythm.  Lungs:  No respiratory distress, no accessory muscle use  Musculoskeletal: Midline lumbar surgical incision is intact with no surrounding erythema or edema or purulent discharge.  Neuro:  Moving all 4 extremities  Skin:  Warm, dry  Psych:  Normal affect      Plan of Care:   Postoperative fever.  Will obtain blood work, chest x-ray, respiratory swab, and UA.  Normal vital signs at this time.  May need further evaluation such as MRI of the lumbar spine.  I evaluated the patient and developed an initial plan of care. I discussed this plan and explained that I, or one of my partners, would be returning to complete the evaluation.     I, Cindy Emily, am serving as a scribe to document services personally performed by German Prince MD, based on my observations and the provider's statements to me.    11/5/2022  [employer]    Portions of this medical record were completed by a scribe. UPON MY REVIEW AND AUTHENTICATION BY ELECTRONIC SIGNATURE, this confirms (a) I performed the applicable clinical services, and (b) the record is accurate.        German Prince MD  11/05/22 6661

## 2022-11-05 NOTE — ED PROVIDER NOTES
History   Chief Complaint:  Post-op Problem       HPI   Leo Morelos is a 82 year old male 3-days post spinal fusion with history of hypertension and diabetes who presents with post-op problem. The patient had a spinal fusion done by Dr. Toure on 11/2, and spent 2 nights in the hospital. He was having chills today and his oral temperature was 102. Pain medications taken around 1500 and Tylenol taken at 1645. His daughter notes that he had an episode of syncope in the hospital due to hypotension. He said the pain has been improving, but he has not yet been able to have a bowel movement. No cough, congestion, vomiting, diarrhea, or chest pain. No pain other than at surgical site. No recent sick exposure.     Review of Systems   Constitutional: Positive for chills and fever.   HENT: Negative for congestion.    Respiratory: Negative for cough.    Cardiovascular: Negative for chest pain.   Gastrointestinal: Positive for constipation. Negative for diarrhea and vomiting.   Musculoskeletal: Negative for arthralgias.   All other systems reviewed and are negative.      Allergies:  No known drug allergies     Medications:  Hydromorphone  Hydroxyzine  Amlodipine  Cephalexin  Finasteride  Lisinopril  Metformin  Metoprolol succinate  Omeprazole  Simvastatin  Tamsulosin  Calcium carbonate  Magnesium hydroxide  Senokot  Cholecalciferol     Past Medical History:     Hypertension  Diabetes mellitus  Gastroesophageal reflux disease  Arthritis      Past Surgical History:    2 coronary stents  Lumbar spinal fusion  Right knee surgery     Social History:  The patient presents to the ED with wife and daughter  PCP: System, Provider Not In     Physical Exam     Patient Vitals for the past 24 hrs:   BP Temp Temp src Pulse Resp SpO2 Height Weight   11/05/22 2130 (!) 140/70 -- -- 75 -- -- -- --   11/05/22 2121 -- 98.9  F (37.2  C) Oral -- -- -- -- --   11/05/22 1938 139/76 -- -- 72 -- 91 % -- --   11/05/22 1931 -- -- -- -- -- -- 1.689 m  "(5' 6.5\") 73.4 kg (161 lb 13.1 oz)   11/05/22 1749 117/81 98.3  F (36.8  C) Oral 83 18 95 % -- --       Physical Exam  Constitutional:       Appearance: He is well-developed.   HENT:      Right Ear: External ear normal.      Left Ear: External ear normal.      Mouth/Throat:      Mouth: Mucous membranes are moist.      Pharynx: Oropharynx is clear. No oropharyngeal exudate or posterior oropharyngeal erythema.   Eyes:      General: No scleral icterus.     Extraocular Movements: Extraocular movements intact.      Conjunctiva/sclera: Conjunctivae normal.      Pupils: Pupils are equal, round, and reactive to light.   Cardiovascular:      Rate and Rhythm: Normal rate and regular rhythm.      Heart sounds: Normal heart sounds. No murmur heard.    No friction rub. No gallop.   Pulmonary:      Effort: Pulmonary effort is normal. No respiratory distress.      Breath sounds: Normal breath sounds. No wheezing or rales.   Abdominal:      General: Bowel sounds are normal. There is no distension.      Palpations: Abdomen is soft. There is no mass.      Tenderness: There is no abdominal tenderness.   Musculoskeletal:         General: Normal range of motion.      Comments: Spine surgical incision appears clean and intact   Skin:     General: Skin is warm and dry.      Capillary Refill: Capillary refill takes less than 2 seconds.      Findings: No rash.   Neurological:      General: No focal deficit present.      Mental Status: He is alert.           Emergency Department Course     Imaging:  XR Chest 2 Views   Final Result   IMPRESSION: Mild left basilar subsegmental atelectasis. The lungs are otherwise clear. No focal pneumonic consolidation or pleural effusion. Spinal degenerative changes.      MR Lumbar Spine w/o & w Contrast   Final Result   IMPRESSION:   1.  Left approach L4-L5 TLIF.   2.  Indeterminant complex fluid collection in the dorsal epidural space from L4-L5 through S1-S2. It measures 10.0 x 11.7 x 68.6 mm in greatest " AP x transverse x craniocaudal dimensions. Both infection and blood containing seroma are in the differential.   3.  Indeterminant fluid collection noted posterior to the thecal sac from L3-L4 cranially. It has simple signal characteristics and is favored to be incidental postoperative change.   4.  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        Report per radiology    Laboratory:  Labs Ordered and Resulted from Time of ED Arrival to Time of ED Departure   CBC WITH PLATELETS - Abnormal       Result Value    WBC Count 10.2      RBC Count 3.44 (*)     Hemoglobin 10.8 (*)     Hematocrit 33.0 (*)     MCV 96      MCH 31.4      MCHC 32.7      RDW 11.2      Platelet Count 316     COMPREHENSIVE METABOLIC PANEL - Abnormal    Sodium 132 (*)     Potassium 4.0      Chloride 95 (*)     Carbon Dioxide (CO2) 27      Anion Gap 10      Urea Nitrogen 11.1      Creatinine 0.92      Calcium 8.6 (*)     Glucose 143 (*)     Alkaline Phosphatase 54      AST 24      ALT 13      Protein Total 5.8 (*)     Albumin 3.4 (*)     Bilirubin Total 0.5      GFR Estimate 84     ROUTINE UA WITH MICROSCOPIC REFLEX TO CULTURE - Abnormal    Color Urine Yellow      Appearance Urine Clear      Glucose Urine Negative      Bilirubin Urine Negative      Ketones Urine Trace (*)     Specific Gravity Urine 1.016      Blood Urine Negative      pH Urine 7.5 (*)     Protein Albumin Urine Negative      Urobilinogen Urine Normal      Nitrite Urine Negative      Leukocyte Esterase Urine Small (*)     RBC Urine <1      WBC Urine 8 (*)    LACTIC ACID WHOLE BLOOD - Normal    Lactic Acid 0.9     INFLUENZA A/B & SARS-COV2 PCR MULTIPLEX - Normal    Influenza A PCR Negative      Influenza B PCR Negative      RSV PCR Negative      SARS CoV2 PCR Negative     PROCALCITONIN   BLOOD CULTURE   BLOOD CULTURE        Emergency Department Course:    Reviewed:  I reviewed nursing notes, vitals and past medical  history    Assessments:  1851 I obtained history and examined the patient as noted above.   2147 I rechecked the patient and explained findings.     Consults:  2137 I spoke with Dr. Sweeney from [facility] regarding the patient's presentation.   2200 I spoke with an on-call spine physician from [facility].     Interventions:  1821: NS 1L IV Bolus   1956: Gadavist 7.5 mL IV    Disposition:  The patient was discharged to home.     Impression & Plan     CMS Diagnoses: None    Medical Decision Making:  Patient presents for evaluation of an episode of fever and chills after spine surgery.  Infectious work-up did not reveal any cause of his fever.  I discussed the finding of his MRI with on-call physician.  Currently they do not believe this would be the source of fever.  Upon further discussion, it was felt that patient could be discharged with very close follow-up in the morning through Dr. Toure.  Patient remained afebrile here.  They are comfortable with the follow-up plan and the work-up.  The incision itself does not appear to be infected.  They voiced understanding of close observation and follow-up.  If he does have any more further febrile episodes, he needs to return.  If he feels worse or has any other new symptoms, he also needs to return.  Patient is in discharge in stable condition.    Diagnosis:    ICD-10-CM    1. Febrile illness  R50.9       2. Encounter for post surgical wound check  Z48.89             Scribe Disclosure:  I, Cindy Gonzales, am serving as a scribe at 6:51 PM on 11/5/2022 to document services personally performed by Richard Dawn MD based on my observations and the provider's statements to me.            Richard Dawn MD  11/28/22 1018

## 2022-11-06 NOTE — DISCHARGE INSTRUCTIONS
Keep close eyes on your temperature  Follow up with  on Monday  Return if worsening symptoms, severe pain, high fever or any other concerns.

## 2022-11-10 LAB
BACTERIA BLD CULT: NO GROWTH
BACTERIA BLD CULT: NO GROWTH

## (undated) DEVICE — SU VICRYL 2-0 CT-1 27" UND J259H

## (undated) DEVICE — MARKER SPHERES PASSIVE MEDT PACK 1 8801071

## (undated) DEVICE — DRAPE X-RAY TUBE 00-901169-01-OEC

## (undated) DEVICE — PAD PROAXIS TABLE KIT SPK10182

## (undated) DEVICE — PACK SET-UP STD 9102

## (undated) DEVICE — ADH SKIN CLOSURE PREMIERPRO EXOFIN 1.0ML 3470

## (undated) DEVICE — SU MONOCRYL 3-0 PS-2 27" Y427H

## (undated) DEVICE — SPONGE SURGIFOAM 01GM POWDER 1978

## (undated) DEVICE — CATH IV ANGIO INTRO 12GA 382277

## (undated) DEVICE — SOL NACL 0.9% IRRIG 1000ML BOTTLE 2F7124

## (undated) DEVICE — GLOVE BIOGEL PI MICRO SZ 8.0 48580

## (undated) DEVICE — BLADE BONE MILL STRK 5.0MM MED 5400-701-000

## (undated) DEVICE — DRSG TEGADERM 4X4 3/4" 1626W

## (undated) DEVICE — DEVICE DUST COLLECTOR BONE BOX S-3500

## (undated) DEVICE — SOL NACL 0.9% 10ML VIAL 0409-4888-02

## (undated) DEVICE — CUSHION INSERT LG PRONE VIEW JACKSON TABLE

## (undated) DEVICE — PREP DURAPREP 26ML APL 8630

## (undated) DEVICE — SU STRATAFIX PDS PLUS 1 CT-1 12" SXPP1A443

## (undated) DEVICE — ESU ELEC BLADE 2.75" COATED/INSULATED E1455

## (undated) DEVICE — DRAIN HEMOVAC RESERVOIR KIT 10FR 1/8" MED 00-2550-002-10

## (undated) DEVICE — SU ETHILON 2-0 PS 18" 585H

## (undated) DEVICE — SUCTION MANIFOLD NEPTUNE 2 SYS 4 PORT 0702-020-000

## (undated) DEVICE — LINEN ORTHO ACL PACK 5447

## (undated) DEVICE — LINEN POUCH DBL 5427

## (undated) DEVICE — DRAPE STERI TOWEL LG 1010

## (undated) DEVICE — ESU PENCIL W/SMOKE EVAC CVPLP2000

## (undated) DEVICE — CATH TRAY FOLEY COUDE SURESTEP 16FR W/DRN BAG LATEX A304416A

## (undated) DEVICE — DRAPE LAP W/ARMBOARD 29410

## (undated) DEVICE — DRSG AQUACEL AG HYDROFIBER 3.5X6" 422604

## (undated) DEVICE — MARKER SPHERES PASSIVE MEDT PACK 5 8801075

## (undated) DEVICE — PACK SMALL SPINE RIDGES

## (undated) DEVICE — GOWN IMPERVIOUS SPECIALTY XLG/XLONG 32474

## (undated) DEVICE — NDL BLUNT 18GA 1" W/O FILTER 305181

## (undated) DEVICE — LINEN DRAPE 54X72" 5467

## (undated) DEVICE — ESU GROUND PAD ADULT W/CORD E7507

## (undated) DEVICE — SYR 03ML LL W/O NDL 309657

## (undated) DEVICE — SUCTION TIP YANKAUER W/O VENT K86

## (undated) DEVICE — TOOL DISSECT MIDAS MR8 14CM MATCH HEAD 3MM MR8-14MH30

## (undated) DEVICE — SPONGE COTTONOID 1/2X1" 80-1402

## (undated) DEVICE — GLOVE BIOGEL PI MICRO INDICATOR UNDERGLOVE SZ 8.5 48985

## (undated) DEVICE — SUCTION FRAZIER 12FR W/OBTURATOR 33120

## (undated) RX ORDER — OXYCODONE HYDROCHLORIDE 5 MG/1
TABLET ORAL
Status: DISPENSED
Start: 2022-11-02

## (undated) RX ORDER — GLYCOPYRROLATE 0.2 MG/ML
INJECTION INTRAMUSCULAR; INTRAVENOUS
Status: DISPENSED
Start: 2022-11-02

## (undated) RX ORDER — FENTANYL CITRATE 50 UG/ML
INJECTION, SOLUTION INTRAMUSCULAR; INTRAVENOUS
Status: DISPENSED
Start: 2022-11-02

## (undated) RX ORDER — VASOPRESSIN 20 U/ML
INJECTION PARENTERAL
Status: DISPENSED
Start: 2022-11-02

## (undated) RX ORDER — PROPOFOL 10 MG/ML
INJECTION, EMULSION INTRAVENOUS
Status: DISPENSED
Start: 2022-11-02

## (undated) RX ORDER — NEOSTIGMINE METHYLSULFATE 1 MG/ML
VIAL (ML) INJECTION
Status: DISPENSED
Start: 2022-11-02

## (undated) RX ORDER — BUPIVACAINE HYDROCHLORIDE AND EPINEPHRINE 2.5; 5 MG/ML; UG/ML
INJECTION, SOLUTION EPIDURAL; INFILTRATION; INTRACAUDAL; PERINEURAL
Status: DISPENSED
Start: 2022-11-02

## (undated) RX ORDER — CEFAZOLIN SODIUM/WATER 2 G/20 ML
SYRINGE (ML) INTRAVENOUS
Status: DISPENSED
Start: 2022-11-02

## (undated) RX ORDER — DEXAMETHASONE SODIUM PHOSPHATE 4 MG/ML
INJECTION, SOLUTION INTRA-ARTICULAR; INTRALESIONAL; INTRAMUSCULAR; INTRAVENOUS; SOFT TISSUE
Status: DISPENSED
Start: 2022-11-02

## (undated) RX ORDER — METHOCARBAMOL 500 MG/1
TABLET, FILM COATED ORAL
Status: DISPENSED
Start: 2022-11-02

## (undated) RX ORDER — GABAPENTIN 100 MG/1
CAPSULE ORAL
Status: DISPENSED
Start: 2022-11-02

## (undated) RX ORDER — LIDOCAINE HYDROCHLORIDE 10 MG/ML
INJECTION, SOLUTION EPIDURAL; INFILTRATION; INTRACAUDAL; PERINEURAL
Status: DISPENSED
Start: 2022-11-02